# Patient Record
Sex: FEMALE | Race: WHITE | NOT HISPANIC OR LATINO | Employment: UNEMPLOYED | URBAN - NONMETROPOLITAN AREA
[De-identification: names, ages, dates, MRNs, and addresses within clinical notes are randomized per-mention and may not be internally consistent; named-entity substitution may affect disease eponyms.]

---

## 2023-03-29 LAB — GLUCOSE, 1 HR SCREEN, PREG: 108 MG/DL

## 2023-05-25 LAB — GLUCOSE, 1 HR SCREEN, PREG: 112 MG/DL

## 2023-06-14 LAB
BASOPHILS (10*3/UL) IN BLOOD BY AUTOMATED COUNT: 0.02 X10E9/L (ref 0–0.1)
BASOPHILS/100 LEUKOCYTES IN BLOOD BY AUTOMATED COUNT: 0.2 % (ref 0–2)
CALCIDIOL (25 OH VITAMIN D3) (NG/ML) IN SER/PLAS: 34 NG/ML
EOSINOPHILS (10*3/UL) IN BLOOD BY AUTOMATED COUNT: 0.04 X10E9/L (ref 0–0.7)
EOSINOPHILS/100 LEUKOCYTES IN BLOOD BY AUTOMATED COUNT: 0.4 % (ref 0–6)
ERYTHROCYTE DISTRIBUTION WIDTH (RATIO) BY AUTOMATED COUNT: 14.4 % (ref 11.5–14.5)
ERYTHROCYTE MEAN CORPUSCULAR HEMOGLOBIN CONCENTRATION (G/DL) BY AUTOMATED: 31.5 G/DL (ref 32–36)
ERYTHROCYTE MEAN CORPUSCULAR VOLUME (FL) BY AUTOMATED COUNT: 90 FL (ref 80–100)
ERYTHROCYTES (10*6/UL) IN BLOOD BY AUTOMATED COUNT: 3.95 X10E12/L (ref 4–5.2)
HEMATOCRIT (%) IN BLOOD BY AUTOMATED COUNT: 35.5 % (ref 36–46)
HEMOGLOBIN (G/DL) IN BLOOD: 11.2 G/DL (ref 12–16)
IMMATURE GRANULOCYTES/100 LEUKOCYTES IN BLOOD BY AUTOMATED COUNT: 0.6 % (ref 0–0.9)
LEUKOCYTES (10*3/UL) IN BLOOD BY AUTOMATED COUNT: 11 X10E9/L (ref 4.4–11.3)
LYMPHOCYTES (10*3/UL) IN BLOOD BY AUTOMATED COUNT: 2.2 X10E9/L (ref 1.2–4.8)
LYMPHOCYTES/100 LEUKOCYTES IN BLOOD BY AUTOMATED COUNT: 20 % (ref 13–44)
MONOCYTES (10*3/UL) IN BLOOD BY AUTOMATED COUNT: 0.48 X10E9/L (ref 0.1–1)
MONOCYTES/100 LEUKOCYTES IN BLOOD BY AUTOMATED COUNT: 4.4 % (ref 2–10)
NEUTROPHILS (10*3/UL) IN BLOOD BY AUTOMATED COUNT: 8.21 X10E9/L (ref 1.2–7.7)
NEUTROPHILS/100 LEUKOCYTES IN BLOOD BY AUTOMATED COUNT: 74.4 % (ref 40–80)
NRBC (PER 100 WBCS) BY AUTOMATED COUNT: 0 /100 WBC (ref 0–0)
PLATELETS (10*3/UL) IN BLOOD AUTOMATED COUNT: 233 X10E9/L (ref 150–450)

## 2023-12-12 ENCOUNTER — OFFICE VISIT (OUTPATIENT)
Dept: PRIMARY CARE | Facility: CLINIC | Age: 33
End: 2023-12-12
Payer: COMMERCIAL

## 2023-12-12 VITALS
SYSTOLIC BLOOD PRESSURE: 139 MMHG | WEIGHT: 281 LBS | HEART RATE: 97 BPM | BODY MASS INDEX: 42.73 KG/M2 | DIASTOLIC BLOOD PRESSURE: 83 MMHG | OXYGEN SATURATION: 96 %

## 2023-12-12 DIAGNOSIS — R05.1 ACUTE COUGH: Primary | ICD-10-CM

## 2023-12-12 PROCEDURE — 99213 OFFICE O/P EST LOW 20 MIN: CPT | Performed by: FAMILY MEDICINE

## 2023-12-12 PROCEDURE — 1036F TOBACCO NON-USER: CPT | Performed by: FAMILY MEDICINE

## 2023-12-12 RX ORDER — GUAIFENESIN 1200 MG
TABLET, EXTENDED RELEASE 12 HR ORAL
COMMUNITY

## 2023-12-12 RX ORDER — CEFDINIR 300 MG/1
300 CAPSULE ORAL 2 TIMES DAILY
Qty: 14 CAPSULE | Refills: 0 | Status: SHIPPED | OUTPATIENT
Start: 2023-12-12 | End: 2023-12-19

## 2023-12-12 RX ORDER — ALBUTEROL SULFATE 90 UG/1
2 AEROSOL, METERED RESPIRATORY (INHALATION) AS NEEDED
COMMUNITY
Start: 2019-01-02 | End: 2024-04-08 | Stop reason: SDUPTHER

## 2023-12-12 RX ORDER — PSYLLIUM HUSK 0.4 G
1 CAPSULE ORAL 2 TIMES DAILY
COMMUNITY
End: 2024-02-28 | Stop reason: WASHOUT

## 2023-12-12 RX ORDER — HYDROXYCHLOROQUINE SULFATE 200 MG/1
1 TABLET, FILM COATED ORAL
COMMUNITY
Start: 2023-06-27

## 2023-12-12 RX ORDER — DICLOXACILLIN SODIUM 500 MG/1
500 CAPSULE ORAL
COMMUNITY
Start: 2023-09-22 | End: 2024-02-28 | Stop reason: WASHOUT

## 2023-12-12 RX ORDER — SERTRALINE HYDROCHLORIDE 25 MG/1
25 TABLET, FILM COATED ORAL DAILY
COMMUNITY

## 2023-12-12 RX ORDER — NORETHINDRONE ACETATE AND ETHINYL ESTRADIOL, ETHINYL ESTRADIOL AND FERROUS FUMARATE 1MG-10(24)
1 KIT ORAL
COMMUNITY
Start: 2020-09-15

## 2023-12-12 ASSESSMENT — ENCOUNTER SYMPTOMS
CHILLS: 0
MYALGIAS: 0
COUGH: 1
HEARTBURN: 0
HEADACHES: 0
WEIGHT LOSS: 0
WHEEZING: 0
RHINORRHEA: 0
SORE THROAT: 0
SWEATS: 0
SHORTNESS OF BREATH: 0
HEMOPTYSIS: 0
FEVER: 0

## 2023-12-12 NOTE — PROGRESS NOTES
Subjective   Patient ID: Keya Moreno is a 33 y.o. female who presents for Cough.    HPI   X several weeks (2)  Was improving until yesterday  Cough is productive (tastes metallic)  No fevers  Son is in  and sick frequently  +fatigue (uses cpap)  Taking flonase intermittently and zyrtec and albuterol inhaler which is helping  +breastfeeding a 4mo    Review of Systems  See HPI    Objective   /83 (BP Location: Left arm, Patient Position: Sitting, BP Cuff Size: Large adult)   Pulse 97   Wt 127 kg (281 lb)   SpO2 96%   BMI 42.73 kg/m²     Physical Exam  Constitutional: Well developed, well nourished, alert and in no acute distress.  Head and Face: NC/AT  Eyes: Normal external exam.   ENT: External inspection of ears normal, tympanic membranes visualized and normal. Nasal mucosa and turbinates swollen and erythematous, clear nasal discharge present. Oral mucosa moist, oropharynx clear without tonsillar exudate or erythema.   Neck: Supple. No cervical lymphadenopathy   Cardiovascular: Regular rate and rhythm, normal S1 and S2, no murmurs, gallops, or rubs.   Pulmonary: No respiratory distress, lungs clear to auscultation bilaterally. No wheezes, rhonchi, rales.  Skin: Warm, well perfused, normal skin turgor and color.   Neurologic: Cranial nerves II-XII grossly intact.    Assessment/Plan   UPPER RESPIRATORY INFECTION PLAN:  Fill antibiotic  - Consider trial of daily probiotic to help minimize GI side effects- Align, Culturelle, DanActive, Florastor are recommended.     Drink at least 6-8 glasses of water daily to thin secretions.     A teaspoon of honey every 4 hours as needed for cough has been shown to reduce cough as well or better than over-the-counter cough suppressants.  Delsym or Robitussin are recommended to stop cough.  Cough drops can also be helpful.     For nasal congestion, please use Papi Med Sinus Rinse at least once daily to rinse out your sinuses.      For sore throat, try honey in  tea, Chloraseptic, Cepacol throat lozenges, and salt water gargles.      Fever or aches can be helped by taking acetaminophen (Tylenol) every four hours as needed, or ibuprofen (Motrin, Advil) or naproxen (Aleve) as directed if you are able.   Maximum dosing of ibuprofen is 800 mg every 8 hours and maximum dose of tylenol is 1,000 mg every 8 hours - do not use for longer than 1 week unless directed by your doctor.       If you should develop a fever and worsening cough or nasal secretions with consistent yellow or green phlegm, please contact us.

## 2024-02-09 ENCOUNTER — OFFICE (OUTPATIENT)
Dept: URBAN - METROPOLITAN AREA CLINIC 27 | Facility: CLINIC | Age: 34
End: 2024-02-09

## 2024-02-09 VITALS
HEART RATE: 89 BPM | SYSTOLIC BLOOD PRESSURE: 118 MMHG | WEIGHT: 271 LBS | HEIGHT: 68 IN | TEMPERATURE: 98.1 F | DIASTOLIC BLOOD PRESSURE: 79 MMHG

## 2024-02-09 DIAGNOSIS — R93.89 ABNORMAL FINDINGS ON DIAGNOSTIC IMAGING OF OTHER SPECIFIED B: ICD-10-CM

## 2024-02-09 DIAGNOSIS — R11.0 NAUSEA: ICD-10-CM

## 2024-02-09 DIAGNOSIS — K21.9 GASTRO-ESOPHAGEAL REFLUX DISEASE WITHOUT ESOPHAGITIS: ICD-10-CM

## 2024-02-09 DIAGNOSIS — K57.92 DIVERTICULITIS OF INTESTINE, PART UNSPECIFIED, WITHOUT PERFO: ICD-10-CM

## 2024-02-09 DIAGNOSIS — R10.9 UNSPECIFIED ABDOMINAL PAIN: ICD-10-CM

## 2024-02-09 PROCEDURE — 99203 OFFICE O/P NEW LOW 30 MIN: CPT | Performed by: NURSE PRACTITIONER

## 2024-02-09 RX ORDER — OMEPRAZOLE 20 MG/1
20 TABLET, DELAYED RELEASE ORAL
Qty: 30 | Refills: 3 | Status: ACTIVE
Start: 2024-02-09

## 2024-02-14 PROBLEM — Z86.69 HX OF MIGRAINE HEADACHES: Status: ACTIVE | Noted: 2019-04-22

## 2024-02-14 PROBLEM — F33.0 DEPRESSION, MAJOR, RECURRENT, MILD (CMS-HCC): Status: ACTIVE | Noted: 2024-02-14

## 2024-02-14 PROBLEM — G47.9 SLEEP DISORDER: Status: ACTIVE | Noted: 2024-02-14

## 2024-02-14 PROBLEM — N20.1 LEFT URETERAL STONE: Status: ACTIVE | Noted: 2021-05-19

## 2024-02-14 PROBLEM — G47.33 OBSTRUCTIVE SLEEP APNEA SYNDROME: Status: ACTIVE | Noted: 2023-01-16

## 2024-02-14 PROBLEM — G43.109 OCULAR MIGRAINE: Status: ACTIVE | Noted: 2023-01-16

## 2024-02-14 PROBLEM — Z86.16 HISTORY OF COVID-19: Status: ACTIVE | Noted: 2023-01-16

## 2024-02-14 PROBLEM — J30.1 SEASONAL ALLERGIC RHINITIS DUE TO POLLEN: Status: ACTIVE | Noted: 2021-04-30

## 2024-02-14 PROBLEM — L30.1 DYSHIDROTIC HAND DERMATITIS: Status: ACTIVE | Noted: 2024-02-14

## 2024-02-14 PROBLEM — J30.9 ALLERGIC RHINITIS: Status: ACTIVE | Noted: 2024-02-14

## 2024-02-14 PROBLEM — R76.8 ANA POSITIVE: Status: ACTIVE | Noted: 2019-03-07

## 2024-02-14 PROBLEM — M35.9 UNDIFFERENTIATED CONNECTIVE TISSUE DISEASE (MULTI): Status: ACTIVE | Noted: 2019-01-04

## 2024-02-14 PROBLEM — R39.14 FEELING OF INCOMPLETE BLADDER EMPTYING: Status: ACTIVE | Noted: 2021-05-19

## 2024-02-14 PROBLEM — F98.8 ADD (ATTENTION DEFICIT DISORDER): Status: ACTIVE | Noted: 2024-02-14

## 2024-02-14 PROBLEM — L65.9 HAIR LOSS: Status: ACTIVE | Noted: 2024-02-14

## 2024-02-14 PROBLEM — K58.9 IRRITABLE BOWEL SYNDROME: Status: ACTIVE | Noted: 2019-06-12

## 2024-02-14 PROBLEM — E66.9 OBESITY, CLASS II, BMI 35-39.9: Status: ACTIVE | Noted: 2020-09-15

## 2024-02-14 PROBLEM — E66.812 OBESITY, CLASS II, BMI 35-39.9: Status: ACTIVE | Noted: 2020-09-15

## 2024-02-14 PROBLEM — E78.5 HYPERLIPIDEMIA: Status: ACTIVE | Noted: 2024-02-14

## 2024-02-14 PROBLEM — Z14.8 GENETIC CARRIER STATUS: Status: ACTIVE | Noted: 2019-06-12

## 2024-02-14 PROBLEM — J30.89 ALLERGIC RHINITIS DUE TO DUST MITE: Status: ACTIVE | Noted: 2021-04-30

## 2024-02-14 PROBLEM — J45.909 ASTHMA (HHS-HCC): Status: ACTIVE | Noted: 2024-02-14

## 2024-02-14 PROBLEM — E55.9 VITAMIN D DEFICIENCY: Status: ACTIVE | Noted: 2023-04-11

## 2024-02-14 PROBLEM — F41.9 ANXIETY, MILD: Status: ACTIVE | Noted: 2024-02-14

## 2024-02-14 PROBLEM — F90.0 ATTENTION DEFICIT HYPERACTIVITY DISORDER, PREDOMINANTLY INATTENTIVE TYPE: Status: ACTIVE | Noted: 2020-09-15

## 2024-02-14 RX ORDER — SUMATRIPTAN SUCCINATE 25 MG/1
25 TABLET ORAL
COMMUNITY
Start: 2024-02-14 | End: 2024-02-28 | Stop reason: WASHOUT

## 2024-02-27 PROBLEM — K57.92 DIVERTICULITIS: Chronic | Status: ACTIVE | Noted: 2024-02-27

## 2024-02-27 PROBLEM — Z09 HOSPITAL DISCHARGE FOLLOW-UP: Chronic | Status: ACTIVE | Noted: 2024-02-27

## 2024-02-27 PROBLEM — R10.84 GENERALIZED ABDOMINAL PAIN: Chronic | Status: ACTIVE | Noted: 2024-02-27

## 2024-02-27 NOTE — PROGRESS NOTES
Subjective        Keya Moreno. Is 33 y.o.. female. Here for follow up office visit-       Chief Complaint   Patient presents with    Follow-up     [facility]       HPI:      How is patient doing today?  Not feeling great today, not worse but not better     Still poor appetite    But no fever, no nausea and vomiting , decreased abd pain   No blood in stool        When is follow up appointment with GI specilaist - Dr Harding?  She hasn't heard from them.              Follow up for ED/UC/Hospital admission:  Date(s):      Today -   2   days post discharge           Dx:  Abdominal pain   Diverticulitis        ED/hosp course-  Patient presents with:  Abdominal Pain: Patient is having ABD pain and feels it is related to her diverticulitis. She is on her second round of antibiotics and the pain hasn't went away. -N/-V    Patient is a 33-year-old female presented to ED with complaints of left lower quadrant abdominal pain. Concern for diverticulitis. She has pain for the past several days with no nausea or vomiting. Recently seen here and treated for diverticulitis, completed a course of antibiotics and got better. Worsened over the weekend and states her gastroenterologist called her in Augmentin. She is here today for evaluation. She does note a generally decreased appetite. Notes she has lost about 18 pounds in the past few weeks due to not eating. She is not vomiting. Does note soft stools is described as being pencil thin but no true watery diarrhea, no blood in her stool.       Labs and urine done in ED        CT ABD/PEL W IVCON  IMPRESSION:    Mild appendiceal dilation, correlate for right lower quadrant pain.    Improved findings of colonic diverticulitis.    Nonspecific mesenteric lymph nodes, similar to prior.    CT scan - borderline dilated appendix without secondary signs of appendicitis.        Treatment- stop Augmentin,  then Saturday - pt called Dr Harding and he restarted Augmentin again       Start  probiotic      Recommended-  Outpatient follow up with GI/PCP.          PMH-   IBS   1/2020 colonosocpy       Has 7 month old baby           Current Outpatient Medications   Medication Instructions    acetaminophen (Tylenol) 325 mg capsule oral    albuterol (ProAir HFA) 90 mcg/actuation inhaler 2 puffs, inhalation, As needed    hydroxychloroquine (Plaquenil) 200 mg tablet 1 tablet, oral, 2 times daily with meals    inulin 4 g, oral, Daily RT    norethindrone-e.estradioL-iron (Lo Loestrin Fe) 1 mg-10 mcg (24)/10 mcg (2) tablet 1 tablet, oral, Daily RT    PRENATAL 2-IRON-FOLIC ACID-OM3 ORAL Prenatal    sertraline (ZOLOFT) 25 mg, oral, Daily           Patient Active Problem List    Diagnosis Date Noted    Hospital discharge follow-up 02/27/2024    Diverticulitis 02/27/2024    Generalized abdominal pain 02/27/2024    ADD (attention deficit disorder) 02/14/2024    Allergic rhinitis 02/14/2024    Anxiety, mild 02/14/2024    Asthma 02/14/2024    Dyshidrotic hand dermatitis 02/14/2024    Hair loss 02/14/2024    Hyperlipidemia 02/14/2024    Depression, major, recurrent, mild (CMS/HCC) 02/14/2024    Sleep disorder 02/14/2024    Vitamin D deficiency 04/11/2023    History of COVID-19 01/16/2023    Obstructive sleep apnea syndrome 01/16/2023    Ocular migraine 01/16/2023    Feeling of incomplete bladder emptying 05/19/2021    Left ureteral stone 05/19/2021    Allergic rhinitis due to dust mite 04/30/2021    Seasonal allergic rhinitis due to pollen 04/30/2021    Attention deficit hyperactivity disorder, predominantly inattentive type 09/15/2020    Obesity, Class II, BMI 35-39.9 09/15/2020    Genetic carrier status 06/12/2019    Irritable bowel syndrome 06/12/2019    Hx of migraine headaches 04/22/2019    CHELE positive 03/07/2019    Undifferentiated connective tissue disease (CMS/HCC) 01/04/2019                     Patient Care Team:  Ruth Sofia MD as PCP - General  Alexis Morris MD as Referring Physician  (Neurology)  Man Harding MD as Referring Physician (Gastroenterology)    REVIEW OF SYSTEMS:        Objective      Vitals:    02/28/24 0932   BP: 107/68   BP Location: Left arm   Patient Position: Sitting   BP Cuff Size: Large adult   Pulse: 79   Temp: 36.5 °C (97.7 °F)   TempSrc: Temporal   SpO2: 95%   Weight: 122 kg (268 lb 3.2 oz)       PHYSICAL:      Pt is A and O x3, NAD, nontoxic, well-hydrated   Head- normocephalic and atraumatic,   EYES- conjunctiva- normal   lids- normal  EARS/NOSE- normal external exam   CV- RRR without murmur  PULM- CTA bilaterally, normal respiratory effort  RESPIRATORY EFFORT- normal , no retractions or nasal flaring   ABD- normoactive BS's , soft, no HSM, no CVAT, NT   EXT- no edema,NT  SKIN- no abnormal skin lesions noted  NEURO- no focal deficits  PSYCH- pleasant, normal judgement and insight        BP Readings from Last 3 Encounters:   02/28/24 107/68   12/12/23 139/83   06/27/23 110/76     Not dizzy or lighthheaded     Wt Readings from Last 3 Encounters:   02/28/24 122 kg (268 lb 3.2 oz)   12/12/23 127 kg (281 lb)   06/27/23 128 kg (283 lb)       BMI Readings from Last 3 Encounters:   02/28/24 40.78 kg/m²   12/12/23 42.73 kg/m²   06/27/23 43.03 kg/m²           The number and complexity of problems addressed is considered moderate.  The amount and/or complexity of data reviewed and analyzed is considered moderate. The risk of complications and/or morbidity/mortality of patient is considered moderate. Overall, this patient encounter is considered a moderate risk visit.    The patient is here for a hospital follow up.  Hospital records were reviewed prior to visit, including relevant labs, imaging findings, consultant notes, and discharge summary.  Medications were reconciled and are current, reviewed today.    Time spent reviewing hospital records prior to today's face-to-face office visit=        25 minutes        Today's face-to-face office visit is occurring within   -      2    -    days of discharge.    No TCM- pt seen within 2 days discharge            Assessment/Plan      Problem List Items Addressed This Visit          Active Problems    Diverticulitis (Chronic)    Generalized abdominal pain (Chronic)    Hospital discharge follow-up - Primary (Chronic)       Finish this round of Augmentin        Follow up with GI specialist - Dr Harding - we did call their office for appointment - May 15 - will be with the PA- and patient is on cancellation list also       Go to ED fo increased abdominal pain- especially RLQ , fever, vomiting, blood int stool, decreased urination, lethargy - discussed with pt         Patient will be seeing Dr Harding in April for prescheduled colonoscopy             Current Outpatient Medications:     acetaminophen (Tylenol) 325 mg capsule, Take by mouth., Disp: , Rfl:     albuterol (ProAir HFA) 90 mcg/actuation inhaler, Inhale 2 puffs if needed., Disp: , Rfl:     hydroxychloroquine (Plaquenil) 200 mg tablet, Take 1 tablet (200 mg) by mouth 2 times a day with meals., Disp: , Rfl:     inulin 2 gram tablet,chewable, Chew 4 g once daily., Disp: , Rfl:     norethindrone-e.estradioL-iron (Lo Loestrin Fe) 1 mg-10 mcg (24)/10 mcg (2) tablet, Take 1 tablet by mouth once daily., Disp: , Rfl:     PRENATAL 2-IRON-FOLIC ACID-OM3 ORAL, Prenatal, Disp: , Rfl:     sertraline (Zoloft) 25 mg tablet, Take 1 tablet (25 mg) by mouth once daily., Disp: , Rfl:

## 2024-02-28 ENCOUNTER — OFFICE VISIT (OUTPATIENT)
Dept: PRIMARY CARE | Facility: CLINIC | Age: 34
End: 2024-02-28
Payer: COMMERCIAL

## 2024-02-28 VITALS
TEMPERATURE: 97.7 F | SYSTOLIC BLOOD PRESSURE: 107 MMHG | OXYGEN SATURATION: 95 % | HEART RATE: 79 BPM | DIASTOLIC BLOOD PRESSURE: 68 MMHG | BODY MASS INDEX: 40.78 KG/M2 | WEIGHT: 268.2 LBS

## 2024-02-28 DIAGNOSIS — Z09 HOSPITAL DISCHARGE FOLLOW-UP: Primary | Chronic | ICD-10-CM

## 2024-02-28 DIAGNOSIS — R10.84 GENERALIZED ABDOMINAL PAIN: Chronic | ICD-10-CM

## 2024-02-28 DIAGNOSIS — K57.92 DIVERTICULITIS: Chronic | ICD-10-CM

## 2024-02-28 PROCEDURE — 99495 TRANSJ CARE MGMT MOD F2F 14D: CPT | Performed by: FAMILY MEDICINE

## 2024-02-28 PROCEDURE — 1036F TOBACCO NON-USER: CPT | Performed by: FAMILY MEDICINE

## 2024-03-01 ENCOUNTER — TELEPHONE (OUTPATIENT)
Dept: PRIMARY CARE | Facility: CLINIC | Age: 34
End: 2024-03-01
Payer: COMMERCIAL

## 2024-03-01 DIAGNOSIS — N39.0 URINARY TRACT INFECTION WITHOUT HEMATURIA, SITE UNSPECIFIED: Primary | ICD-10-CM

## 2024-03-01 RX ORDER — SULFAMETHOXAZOLE AND TRIMETHOPRIM 800; 160 MG/1; MG/1
1 TABLET ORAL 2 TIMES DAILY
Qty: 20 TABLET | Refills: 0 | Status: SHIPPED | OUTPATIENT
Start: 2024-03-01 | End: 2024-03-11

## 2024-03-01 NOTE — TELEPHONE ENCOUNTER
Pt has apt on Monday with GI Dr Harding and she seen urine cx from the ER visit on my chart today that shows she has e coil and she called gi they told her to follow up with you.   Pt is having abd pain at and diarrhea.   Pt is hydrated but no appetite. Pt is currently on Augmentin again that started on Saturday.   When she was looking up medication it started it was not a good treatment for e coil.   Please advise poss medication change or what to do?

## 2024-03-01 NOTE — TELEPHONE ENCOUNTER
Left detailed message on patients voicemail, advised patient to give us a call back with any questions or concerns.

## 2024-03-08 ENCOUNTER — OFFICE (OUTPATIENT)
Dept: URBAN - METROPOLITAN AREA CLINIC 26 | Facility: CLINIC | Age: 34
End: 2024-03-08

## 2024-03-08 VITALS
TEMPERATURE: 97.8 F | SYSTOLIC BLOOD PRESSURE: 115 MMHG | DIASTOLIC BLOOD PRESSURE: 76 MMHG | HEIGHT: 68 IN | WEIGHT: 272 LBS | HEART RATE: 81 BPM

## 2024-03-08 DIAGNOSIS — I88.0 NONSPECIFIC MESENTERIC LYMPHADENITIS: ICD-10-CM

## 2024-03-08 DIAGNOSIS — K57.92 DIVERTICULITIS OF INTESTINE, PART UNSPECIFIED, WITHOUT PERFO: ICD-10-CM

## 2024-03-08 DIAGNOSIS — K21.9 GASTRO-ESOPHAGEAL REFLUX DISEASE WITHOUT ESOPHAGITIS: ICD-10-CM

## 2024-03-08 DIAGNOSIS — R10.30 LOWER ABDOMINAL PAIN, UNSPECIFIED: ICD-10-CM

## 2024-03-08 PROCEDURE — 99214 OFFICE O/P EST MOD 30 MIN: CPT | Performed by: NURSE PRACTITIONER

## 2024-04-08 VITALS
DIASTOLIC BLOOD PRESSURE: 52 MMHG | SYSTOLIC BLOOD PRESSURE: 136 MMHG | SYSTOLIC BLOOD PRESSURE: 136 MMHG | SYSTOLIC BLOOD PRESSURE: 106 MMHG | RESPIRATION RATE: 20 BRPM | DIASTOLIC BLOOD PRESSURE: 82 MMHG | HEART RATE: 83 BPM | OXYGEN SATURATION: 98 % | OXYGEN SATURATION: 99 % | SYSTOLIC BLOOD PRESSURE: 137 MMHG | HEART RATE: 93 BPM | RESPIRATION RATE: 19 BRPM | RESPIRATION RATE: 20 BRPM | DIASTOLIC BLOOD PRESSURE: 82 MMHG | RESPIRATION RATE: 19 BRPM | SYSTOLIC BLOOD PRESSURE: 112 MMHG | RESPIRATION RATE: 19 BRPM | HEART RATE: 93 BPM | SYSTOLIC BLOOD PRESSURE: 106 MMHG | DIASTOLIC BLOOD PRESSURE: 65 MMHG | TEMPERATURE: 97.3 F | TEMPERATURE: 97.3 F | HEART RATE: 96 BPM | HEART RATE: 96 BPM | RESPIRATION RATE: 18 BRPM | WEIGHT: 266 LBS | RESPIRATION RATE: 18 BRPM | OXYGEN SATURATION: 97 % | DIASTOLIC BLOOD PRESSURE: 52 MMHG | OXYGEN SATURATION: 97 % | DIASTOLIC BLOOD PRESSURE: 47 MMHG | OXYGEN SATURATION: 94 % | RESPIRATION RATE: 13 BRPM | RESPIRATION RATE: 22 BRPM | HEART RATE: 98 BPM | DIASTOLIC BLOOD PRESSURE: 65 MMHG | HEART RATE: 93 BPM | HEART RATE: 102 BPM | OXYGEN SATURATION: 95 % | OXYGEN SATURATION: 96 % | DIASTOLIC BLOOD PRESSURE: 40 MMHG | HEART RATE: 96 BPM | DIASTOLIC BLOOD PRESSURE: 82 MMHG | OXYGEN SATURATION: 97 % | SYSTOLIC BLOOD PRESSURE: 112 MMHG | RESPIRATION RATE: 20 BRPM | HEART RATE: 89 BPM | RESPIRATION RATE: 18 BRPM | OXYGEN SATURATION: 95 % | OXYGEN SATURATION: 96 % | RESPIRATION RATE: 22 BRPM | SYSTOLIC BLOOD PRESSURE: 93 MMHG | OXYGEN SATURATION: 94 % | SYSTOLIC BLOOD PRESSURE: 93 MMHG | WEIGHT: 266 LBS | OXYGEN SATURATION: 96 % | HEART RATE: 89 BPM | RESPIRATION RATE: 13 BRPM | HEART RATE: 102 BPM | DIASTOLIC BLOOD PRESSURE: 40 MMHG | HEART RATE: 83 BPM | OXYGEN SATURATION: 94 % | DIASTOLIC BLOOD PRESSURE: 52 MMHG | SYSTOLIC BLOOD PRESSURE: 106 MMHG | SYSTOLIC BLOOD PRESSURE: 137 MMHG | SYSTOLIC BLOOD PRESSURE: 136 MMHG | TEMPERATURE: 97.3 F | SYSTOLIC BLOOD PRESSURE: 93 MMHG | DIASTOLIC BLOOD PRESSURE: 65 MMHG | OXYGEN SATURATION: 98 % | HEART RATE: 102 BPM | WEIGHT: 266 LBS | HEIGHT: 68 IN | HEART RATE: 98 BPM | OXYGEN SATURATION: 95 % | HEART RATE: 83 BPM | RESPIRATION RATE: 22 BRPM | HEART RATE: 89 BPM | HEIGHT: 68 IN | DIASTOLIC BLOOD PRESSURE: 47 MMHG | HEART RATE: 98 BPM | OXYGEN SATURATION: 99 % | OXYGEN SATURATION: 99 % | RESPIRATION RATE: 13 BRPM | HEIGHT: 68 IN | SYSTOLIC BLOOD PRESSURE: 112 MMHG | DIASTOLIC BLOOD PRESSURE: 47 MMHG | SYSTOLIC BLOOD PRESSURE: 137 MMHG | OXYGEN SATURATION: 98 % | DIASTOLIC BLOOD PRESSURE: 40 MMHG

## 2024-04-08 DIAGNOSIS — J30.89 ALLERGIC RHINITIS DUE TO DUST MITE: ICD-10-CM

## 2024-04-08 DIAGNOSIS — J45.20 MILD INTERMITTENT ASTHMA WITHOUT COMPLICATION (HHS-HCC): ICD-10-CM

## 2024-04-08 RX ORDER — ALBUTEROL SULFATE 90 UG/1
2 AEROSOL, METERED RESPIRATORY (INHALATION) AS NEEDED
Qty: 18 G | Refills: 1 | Status: SHIPPED | OUTPATIENT
Start: 2024-04-08

## 2024-04-14 PROBLEM — R63.0 ANOREXIA: Status: ACTIVE | Noted: 2024-04-15

## 2024-04-14 PROBLEM — R63.4 WEIGHT LOSS: Status: ACTIVE | Noted: 2024-04-15

## 2024-04-15 ENCOUNTER — AMBULATORY SURGICAL CENTER (OUTPATIENT)
Dept: URBAN - METROPOLITAN AREA SURGERY 12 | Facility: SURGERY | Age: 34
End: 2024-04-15
Payer: COMMERCIAL

## 2024-04-15 ENCOUNTER — OFFICE (OUTPATIENT)
Dept: URBAN - METROPOLITAN AREA PATHOLOGY 2 | Facility: PATHOLOGY | Age: 34
End: 2024-04-15
Payer: COMMERCIAL

## 2024-04-15 DIAGNOSIS — K64.4 RESIDUAL HEMORRHOIDAL SKIN TAGS: ICD-10-CM

## 2024-04-15 DIAGNOSIS — K21.9 GASTRO-ESOPHAGEAL REFLUX DISEASE WITHOUT ESOPHAGITIS: ICD-10-CM

## 2024-04-15 DIAGNOSIS — K44.9 DIAPHRAGMATIC HERNIA WITHOUT OBSTRUCTION OR GANGRENE: ICD-10-CM

## 2024-04-15 DIAGNOSIS — K62.1 RECTAL POLYP: ICD-10-CM

## 2024-04-15 DIAGNOSIS — K57.92 DIVERTICULITIS OF INTESTINE, PART UNSPECIFIED, WITHOUT PERFO: ICD-10-CM

## 2024-04-15 DIAGNOSIS — K57.30 DIVERTICULOSIS OF LARGE INTESTINE WITHOUT PERFORATION OR ABS: ICD-10-CM

## 2024-04-15 DIAGNOSIS — K22.89 OTHER SPECIFIED DISEASE OF ESOPHAGUS: ICD-10-CM

## 2024-04-15 DIAGNOSIS — K64.8 OTHER HEMORRHOIDS: ICD-10-CM

## 2024-04-15 DIAGNOSIS — R63.0 ANOREXIA: ICD-10-CM

## 2024-04-15 DIAGNOSIS — R10.30 LOWER ABDOMINAL PAIN, UNSPECIFIED: ICD-10-CM

## 2024-04-15 DIAGNOSIS — R63.4 ABNORMAL WEIGHT LOSS: ICD-10-CM

## 2024-04-15 DIAGNOSIS — K31.89 OTHER DISEASES OF STOMACH AND DUODENUM: ICD-10-CM

## 2024-04-15 DIAGNOSIS — K29.50 UNSPECIFIED CHRONIC GASTRITIS WITHOUT BLEEDING: ICD-10-CM

## 2024-04-15 PROCEDURE — 88313 SPECIAL STAINS GROUP 2: CPT | Performed by: PATHOLOGY

## 2024-04-15 PROCEDURE — 43239 EGD BIOPSY SINGLE/MULTIPLE: CPT | Mod: 51 | Performed by: INTERNAL MEDICINE

## 2024-04-15 PROCEDURE — 45380 COLONOSCOPY AND BIOPSY: CPT | Performed by: INTERNAL MEDICINE

## 2024-04-15 PROCEDURE — 88342 IMHCHEM/IMCYTCHM 1ST ANTB: CPT | Performed by: PATHOLOGY

## 2024-04-15 PROCEDURE — 88305 TISSUE EXAM BY PATHOLOGIST: CPT | Performed by: PATHOLOGY

## 2024-04-23 ENCOUNTER — OFFICE VISIT (OUTPATIENT)
Dept: PRIMARY CARE | Facility: CLINIC | Age: 34
End: 2024-04-23
Payer: COMMERCIAL

## 2024-04-23 VITALS
SYSTOLIC BLOOD PRESSURE: 111 MMHG | OXYGEN SATURATION: 97 % | HEART RATE: 87 BPM | BODY MASS INDEX: 41.8 KG/M2 | DIASTOLIC BLOOD PRESSURE: 65 MMHG | TEMPERATURE: 98.4 F | WEIGHT: 274.9 LBS | RESPIRATION RATE: 18 BRPM

## 2024-04-23 DIAGNOSIS — R30.0 DYSURIA: Primary | ICD-10-CM

## 2024-04-23 DIAGNOSIS — J30.9 ALLERGIC RHINITIS, UNSPECIFIED SEASONALITY, UNSPECIFIED TRIGGER: Chronic | ICD-10-CM

## 2024-04-23 PROCEDURE — 1036F TOBACCO NON-USER: CPT | Performed by: FAMILY MEDICINE

## 2024-04-23 PROCEDURE — 81001 URINALYSIS AUTO W/SCOPE: CPT

## 2024-04-23 PROCEDURE — 99214 OFFICE O/P EST MOD 30 MIN: CPT | Performed by: FAMILY MEDICINE

## 2024-04-23 PROCEDURE — 87086 URINE CULTURE/COLONY COUNT: CPT

## 2024-04-23 RX ORDER — CEPHALEXIN 500 MG/1
500 CAPSULE ORAL EVERY 12 HOURS
COMMUNITY
Start: 2024-04-19 | End: 2024-04-24

## 2024-04-23 RX ORDER — AZELASTINE HYDROCHLORIDE, FLUTICASONE PROPIONATE 137; 50 UG/1; UG/1
1 SPRAY, METERED NASAL 2 TIMES DAILY
Qty: 1 EACH | Refills: 1 | Status: SHIPPED | OUTPATIENT
Start: 2024-04-23

## 2024-04-23 RX ORDER — OMEPRAZOLE 20 MG/1
20 TABLET, DELAYED RELEASE ORAL NIGHTLY
COMMUNITY
Start: 2024-04-08

## 2024-04-23 RX ORDER — NORETHINDRONE 0.35 MG
KIT ORAL
COMMUNITY
Start: 2024-03-16 | End: 2024-04-23 | Stop reason: WASHOUT

## 2024-04-23 RX ORDER — TRETINOIN 0.25 MG/G
CREAM TOPICAL
COMMUNITY
Start: 2024-04-20

## 2024-04-23 RX ORDER — CLINDAMYCIN PHOSPHATE 10 UG/ML
LOTION TOPICAL
COMMUNITY
Start: 2024-04-20

## 2024-04-23 ASSESSMENT — ENCOUNTER SYMPTOMS
FLANK PAIN: 0
NAUSEA: 0
SWEATS: 0
FREQUENCY: 1
VOMITING: 0
CHILLS: 1
DYSURIA: 1
HEMATURIA: 0

## 2024-04-23 NOTE — PROGRESS NOTES
Subjective        Keya Moreno is a 33 y.o. female who presents for      HPI:          Chief Complaint   Patient presents with    Female Dysuria      x 4 days  Pt denies fever, nausea/vomiting, abd pain, hematuria, frequency and back pain.  PT had telemed appt 04/19. RX for Cephalexin. 1 day left of ABX   Saw - UC - Good Rx           Keya Moreno. is a  33 y.o..  female. Who is here for acute visit today.        Pt saw Dr Griffin - 3/6/24 - abd symptoms resolved at that time      Dr Harding- colonoscopy- last Monday- diverticulitis resolved and appendix ok                 Social History     Tobacco Use   Smoking Status Never   Smokeless Tobacco Never         Review of Systems:        Objective        Vitals:    04/23/24 1106   BP: 111/65   BP Location: Left arm   Patient Position: Sitting   BP Cuff Size: Large adult   Pulse: 87   Resp: 18   Temp: 36.9 °C (98.4 °F)   SpO2: 97%   Weight: 125 kg (274 lb 14.4 oz)       Pt is A and O x3, NAD, nontoxic, well-hydrated   Head- normocephalic and atraumatic,   EYES- conjunctiva- normal   lids- normal  EARS/NOSE- normal external exam   CV- RRR without murmur  PULM- CTA bilaterally, normal respiratory effort  RESPIRATORY EFFORT- normal , no retractions or nasal flaring   ABD- normoactive BS's , soft, no HSM, no CVAT, NT   EXT- no edema,NT  SKIN- no abnormal skin lesions noted  NEURO- no focal deficits  PSYCH- pleasant, normal judgement and insight          The number and complexity of problems addressed is considered moderate.  The amount and/or complexity of data reviewed and analyzed is considered moderate. The risk of complications and/or morbidity/mortality of patient is considered moderate. Overall, this patient encounter is considered a moderate risk visit.            BP Readings from Last 3 Encounters:   04/23/24 111/65   02/28/24 107/68   12/12/23 139/83           Wt Readings from Last 3 Encounters:   04/23/24 125 kg (274 lb 14.4 oz)   02/28/24 122 kg (268 lb 3.2  oz)   12/12/23 127 kg (281 lb)         BMI Readings from Last 3 Encounters:   04/23/24 41.80 kg/m²   02/28/24 40.78 kg/m²   12/12/23 42.73 kg/m²         Assessment/Plan      1. Dysuria  Urinalysis with Reflex Microscopic    Urine Culture    Urinalysis with Reflex Microscopic      2. Allergic rhinitis, unspecified seasonality, unspecified trigger  azelastine-fluticasone (Dymista) 137-50 mcg/spray nasal spray          Orders Placed This Encounter   Procedures    Urine Culture    Urinalysis with Reflex Microscopic           Urine ordered       Increase fluids      No change with Cephalexin        Send urine culture         Call if no better or if symptoms worsen

## 2024-04-24 ENCOUNTER — TELEPHONE (OUTPATIENT)
Dept: PRIMARY CARE | Facility: CLINIC | Age: 34
End: 2024-04-24
Payer: COMMERCIAL

## 2024-04-24 LAB
APPEARANCE UR: ABNORMAL
BACTERIA UR CULT: NORMAL
BILIRUB UR STRIP.AUTO-MCNC: NEGATIVE MG/DL
COLOR UR: ABNORMAL
GLUCOSE UR STRIP.AUTO-MCNC: NORMAL MG/DL
KETONES UR STRIP.AUTO-MCNC: NEGATIVE MG/DL
LEUKOCYTE ESTERASE UR QL STRIP.AUTO: NEGATIVE
MUCOUS THREADS #/AREA URNS AUTO: NORMAL /LPF
NITRITE UR QL STRIP.AUTO: NEGATIVE
PH UR STRIP.AUTO: 5.5 [PH]
PROT UR STRIP.AUTO-MCNC: ABNORMAL MG/DL
RBC # UR STRIP.AUTO: NEGATIVE /UL
RBC #/AREA URNS AUTO: NORMAL /HPF
SP GR UR STRIP.AUTO: 1.02
SQUAMOUS #/AREA URNS AUTO: NORMAL /HPF
UROBILINOGEN UR STRIP.AUTO-MCNC: NORMAL MG/DL
WBC #/AREA URNS AUTO: NORMAL /HPF

## 2024-04-24 NOTE — TELEPHONE ENCOUNTER
Dr. Sofia,    Office received prior auth request for the azelastine-fluticasone nasal spray prescribed yesterday.     Covered alternatives are fluticasone nasal spray and azelastine nasal spray.    Any clinically relevant reason to proceed with PA? Or do you want to change medication?     Please reply so PA can be started or patient can be informed of change in med.    Thanks

## 2024-06-11 ENCOUNTER — LAB REQUISITION (OUTPATIENT)
Dept: LAB | Facility: HOSPITAL | Age: 34
End: 2024-06-11
Payer: COMMERCIAL

## 2024-06-11 DIAGNOSIS — N39.0 URINARY TRACT INFECTION, SITE NOT SPECIFIED: ICD-10-CM

## 2024-06-11 PROCEDURE — 87186 SC STD MICRODIL/AGAR DIL: CPT

## 2024-06-11 PROCEDURE — 87086 URINE CULTURE/COLONY COUNT: CPT

## 2024-06-14 LAB — BACTERIA UR CULT: ABNORMAL

## 2024-06-25 ENCOUNTER — APPOINTMENT (OUTPATIENT)
Dept: SLEEP MEDICINE | Facility: CLINIC | Age: 34
End: 2024-06-25
Payer: COMMERCIAL

## 2024-08-22 PROBLEM — Z13.1 SCREENING FOR DIABETES MELLITUS: Chronic | Status: ACTIVE | Noted: 2024-08-22

## 2024-08-22 PROBLEM — E78.5 HYPERLIPIDEMIA: Status: RESOLVED | Noted: 2024-02-14 | Resolved: 2024-08-22

## 2024-08-22 PROBLEM — E78.00 HYPERCHOLESTEREMIA: Chronic | Status: ACTIVE | Noted: 2024-08-22

## 2024-08-22 PROBLEM — F33.42 RECURRENT MAJOR DEPRESSIVE DISORDER, IN FULL REMISSION (CMS-HCC): Chronic | Status: ACTIVE | Noted: 2024-08-22

## 2024-08-22 PROBLEM — F33.0 DEPRESSION, MAJOR, RECURRENT, MILD (CMS-HCC): Status: RESOLVED | Noted: 2024-02-14 | Resolved: 2024-08-22

## 2024-08-22 PROBLEM — G47.9 SLEEP DISORDER: Status: RESOLVED | Noted: 2024-02-14 | Resolved: 2024-08-22

## 2024-08-22 PROBLEM — J30.1 SEASONAL ALLERGIC RHINITIS DUE TO POLLEN: Status: RESOLVED | Noted: 2021-04-30 | Resolved: 2024-08-22

## 2024-08-22 NOTE — PROGRESS NOTES
Subjective        Keya Moreno. Is 34 y.o.. female. Here for follow up office visit-       Chief Complaint   Patient presents with    Follow-up       HPI:        Follow up for anxiety , depression   Is pt still seeing Dr Chaparro for zoloft? No, mental health at this time    On zoloft per OB/GYN- 25 mg daily     Works well       Scales reviewed        KATT-7 Total Score: 6 (08/28/24 1256)  Patient Health Questionnaire-9 Score: 6 (08/28/24 1254)  Patient Health Questionnaire-2 Score: 1 (08/28/24 1254)         Pt is doing well          Other medical specialists are involved in patient's care.    Any recent notes that were available were reviewed.    All conditions are monitored, evaluated and assessed regularly.    Patient is compliant with current treatment regimen/medications.    Specialists involved include:      Dr Marquez- HARVEY with CPAP - working well per pt         Dr Griffin- 7/17/24 had appendectomy- appendocele/mucocele      Bailey Harmon, CNP- Neuro       Douglas is 5 yrs old- in June Lily- 13 months           REVIEW OF SYSTEMS:        Objective      Vitals:    08/28/24 1258   BP: 99/83   BP Location: Left arm   Patient Position: Sitting   BP Cuff Size: Large adult   Pulse: 85   Resp: 12   Temp: 36.4 °C (97.5 °F)   TempSrc: Temporal   SpO2: 95%   Weight: 119 kg (263 lb 6.4 oz)         Not dizzy or light headed               PHYSICAL:      Patient is alert and oriented x 3 , NAD  HEAD- normocephalic and atraumatic   EYES-conjunctiva-normal, lids - normal  EARS/NOSE- normal external exam   NECK-supple,FROM  CV- RRR without murmur  PULM- CTA bilaterally, normal respiratory effort  RESPIRATORY EFFORT- normal , no retractions or nasal flaring   ABD- normoactive BS's   EXT- no edema,NT  SKIN- no abnormal skin lesions noted  NEURO- no focal deficits  PSYCH- pleasant, normal judgement and insight      BP Readings from Last 3 Encounters:   08/28/24 99/83   04/23/24 111/65   02/28/24 107/68             Wt  Readings from Last 3 Encounters:   08/28/24 119 kg (263 lb 6.4 oz)   04/23/24 125 kg (274 lb 14.4 oz)   02/28/24 122 kg (268 lb 3.2 oz)           BMI Readings from Last 3 Encounters:   08/28/24 40.05 kg/m²   04/23/24 41.80 kg/m²   02/28/24 40.78 kg/m²               The number and complexity of problems addressed is considered moderate.  The amount and/or complexity of data reviewed and analyzed is considered moderate. The risk of complications and/or morbidity/mortality of patient is considered moderate. Overall, this patient encounter is considered a moderate risk visit.    Possible side effects and cautions  All SSRIs are thought to work in a similar way and generally can cause similar side effects, though some people may not experience any. Many side effects may go away after the first few weeks of treatment, while others may lead you and your doctor to try a different drug.    If you can't tolerate one SSRI, you may be able to tolerate a different one, as SSRIs differ in their potencies at blocking serotonin reuptake and in how quickly the body eliminates (metabolizes) the drug.    Possible side effects of SSRIs may include, among others:    Nausea, vomiting or diarrhea  Headache  Drowsiness  Dry mouth  Insomnia  Nervousness, agitation or restlessness  Dizziness  Sexual problems, such as reduced sexual desire, difficulty reaching orgasm or inability to maintain an erection (erectile dysfunction)  Impact on appetite, leading to weight loss or weight gain  Taking your medication with food may reduce the risk of nausea. Also, as long as your medication doesn't keep you from sleeping, you can reduce the impact of nausea by taking it at bedtime.    Which antidepressant is best for you depends on a number of issues, such as your symptoms and any other health conditions you may have. Ask your doctor and pharmacist about the most common possible side effects for your specific SSRI and read the patient medication guide  that comes with the prescription.    Safety issues  SSRIs are generally safe for most people. However, in some circumstances they can cause problems. For example, high doses of citalopram may cause dangerous abnormal heart rhythms, so doses over 40 milligrams (mg) a day should be avoided according to the FDA and the . Issues to discuss with your doctor before you take an SSRI include:    Drug interactions. When taking an antidepressant, tell your doctor about any other prescription or over-the-counter medications, herbs or other supplements you're taking. Some antidepressants can interfere with the effectiveness of other medications, and some can cause dangerous reactions when combined with certain medications or herbal supplements.    For example, SSRIs may increase your risk of bleeding, especially when you're taking other medications that increase the risk of bleeding, such as nonsteroidal anti-inflammatory drugs (NSAIDs), aspirin, warfarin (Coumadin, Jantoven) and other blood thinners.    Serotonin syndrome. Rarely, an antidepressant can cause high levels of serotonin to accumulate in your body. Serotonin syndrome most often occurs when two medications that raise the level of serotonin are combined. These include, for example, other antidepressants, certain pain or headache medications, and the herbal supplement Jeff's wort.    Signs and symptoms of serotonin syndrome include anxiety, agitation, high fever, sweating, confusion, tremors, restlessness, lack of coordination, major changes in blood pressure and a rapid heart rate. Seek immediate medical attention if you have any of these signs or symptoms.    Antidepressants and pregnancy. Talk to your doctor about the risks and benefits of using specific antidepressants. Some antidepressants may harm your baby if you take them during pregnancy or while you're breast-feeding. If you're taking an antidepressant and you're considering getting pregnant,  talk to your doctor about the possible risks. Don't stop taking your medication without contacting your doctor first, as stopping might pose risks for you.  Suicide risk and antidepressants  Most antidepressants are generally safe, but the FDA requires that all antidepressants carry black box warnings, the strictest warnings for prescriptions. In some cases, children, teenagers and young adults under 25 may have an increase in suicidal thoughts or behavior when taking antidepressants, especially in the first few weeks after starting or when the dose is changed.        Assessment/Plan      Assessment & Plan  Anxiety, mild  See depression       Recurrent major depressive disorder, in full remission (CMS-HCC)    Orders:    Follow Up In [facility] - PCP - Established; Future    Obstructive sleep apnea syndrome  Managed by Dr Marquez  On CPAP  Doing well        Hx of migraine headaches  Managed by Neuro  Doing well        Allergic rhinitis, unspecified seasonality, unspecified trigger         Mild intermittent asthma without complication ([facility])  Albuterol as needed          Continue medication      Ordered lab      Follow up for PE

## 2024-08-27 ASSESSMENT — PROMIS GLOBAL HEALTH SCALE
EMOTIONAL_PROBLEMS: RARELY
CARRYOUT_SOCIAL_ACTIVITIES: VERY GOOD
RATE_SOCIAL_SATISFACTION: VERY GOOD
RATE_AVERAGE_FATIGUE: MILD
RATE_GENERAL_HEALTH: GOOD
CARRYOUT_PHYSICAL_ACTIVITIES: COMPLETELY
RATE_PHYSICAL_HEALTH: GOOD
RATE_MENTAL_HEALTH: VERY GOOD
RATE_AVERAGE_PAIN: 0
RATE_QUALITY_OF_LIFE: VERY GOOD

## 2024-08-28 ENCOUNTER — APPOINTMENT (OUTPATIENT)
Dept: PRIMARY CARE | Facility: CLINIC | Age: 34
End: 2024-08-28
Payer: COMMERCIAL

## 2024-08-28 VITALS
HEART RATE: 85 BPM | WEIGHT: 263.4 LBS | OXYGEN SATURATION: 95 % | RESPIRATION RATE: 12 BRPM | SYSTOLIC BLOOD PRESSURE: 99 MMHG | DIASTOLIC BLOOD PRESSURE: 83 MMHG | TEMPERATURE: 97.5 F | BODY MASS INDEX: 40.05 KG/M2

## 2024-08-28 DIAGNOSIS — F33.42 RECURRENT MAJOR DEPRESSIVE DISORDER, IN FULL REMISSION (CMS-HCC): Chronic | ICD-10-CM

## 2024-08-28 DIAGNOSIS — J30.9 ALLERGIC RHINITIS, UNSPECIFIED SEASONALITY, UNSPECIFIED TRIGGER: Chronic | ICD-10-CM

## 2024-08-28 DIAGNOSIS — F41.9 ANXIETY, MILD: Primary | Chronic | ICD-10-CM

## 2024-08-28 DIAGNOSIS — G47.33 OBSTRUCTIVE SLEEP APNEA SYNDROME: Chronic | ICD-10-CM

## 2024-08-28 DIAGNOSIS — J45.20 MILD INTERMITTENT ASTHMA WITHOUT COMPLICATION (HHS-HCC): Chronic | ICD-10-CM

## 2024-08-28 DIAGNOSIS — Z86.69 HX OF MIGRAINE HEADACHES: Chronic | ICD-10-CM

## 2024-08-28 PROCEDURE — 1036F TOBACCO NON-USER: CPT | Performed by: FAMILY MEDICINE

## 2024-08-28 PROCEDURE — 99214 OFFICE O/P EST MOD 30 MIN: CPT | Performed by: FAMILY MEDICINE

## 2024-08-28 ASSESSMENT — PATIENT HEALTH QUESTIONNAIRE - PHQ9
7. TROUBLE CONCENTRATING ON THINGS, SUCH AS READING THE NEWSPAPER OR WATCHING TELEVISION: MORE THAN HALF THE DAYS
3. TROUBLE FALLING OR STAYING ASLEEP OR SLEEPING TOO MUCH: SEVERAL DAYS
2. FEELING DOWN, DEPRESSED OR HOPELESS: NOT AT ALL
5. POOR APPETITE OR OVEREATING: SEVERAL DAYS
10. IF YOU CHECKED OFF ANY PROBLEMS, HOW DIFFICULT HAVE THESE PROBLEMS MADE IT FOR YOU TO DO YOUR WORK, TAKE CARE OF THINGS AT HOME, OR GET ALONG WITH OTHER PEOPLE: SOMEWHAT DIFFICULT
SUM OF ALL RESPONSES TO PHQ QUESTIONS 1-9: 6
4. FEELING TIRED OR HAVING LITTLE ENERGY: SEVERAL DAYS
1. LITTLE INTEREST OR PLEASURE IN DOING THINGS: SEVERAL DAYS
6. FEELING BAD ABOUT YOURSELF - OR THAT YOU ARE A FAILURE OR HAVE LET YOURSELF OR YOUR FAMILY DOWN: NOT AT ALL
9. THOUGHTS THAT YOU WOULD BE BETTER OFF DEAD, OR OF HURTING YOURSELF: NOT AT ALL
8. MOVING OR SPEAKING SO SLOWLY THAT OTHER PEOPLE COULD HAVE NOTICED. OR THE OPPOSITE, BEING SO FIGETY OR RESTLESS THAT YOU HAVE BEEN MOVING AROUND A LOT MORE THAN USUAL: NOT AT ALL
SUM OF ALL RESPONSES TO PHQ9 QUESTIONS 1 AND 2: 1

## 2024-08-28 ASSESSMENT — ANXIETY QUESTIONNAIRES
3. WORRYING TOO MUCH ABOUT DIFFERENT THINGS: SEVERAL DAYS
IF YOU CHECKED OFF ANY PROBLEMS ON THIS QUESTIONNAIRE, HOW DIFFICULT HAVE THESE PROBLEMS MADE IT FOR YOU TO DO YOUR WORK, TAKE CARE OF THINGS AT HOME, OR GET ALONG WITH OTHER PEOPLE: SOMEWHAT DIFFICULT
2. NOT BEING ABLE TO STOP OR CONTROL WORRYING: SEVERAL DAYS
GAD7 TOTAL SCORE: 6
6. BECOMING EASILY ANNOYED OR IRRITABLE: SEVERAL DAYS
5. BEING SO RESTLESS THAT IT IS HARD TO SIT STILL: NOT AT ALL
7. FEELING AFRAID AS IF SOMETHING AWFUL MIGHT HAPPEN: SEVERAL DAYS
4. TROUBLE RELAXING: SEVERAL DAYS
1. FEELING NERVOUS, ANXIOUS, OR ON EDGE: SEVERAL DAYS

## 2024-10-08 ENCOUNTER — OFFICE VISIT (OUTPATIENT)
Dept: SLEEP MEDICINE | Facility: CLINIC | Age: 34
End: 2024-10-08
Payer: COMMERCIAL

## 2024-10-08 VITALS
SYSTOLIC BLOOD PRESSURE: 160 MMHG | WEIGHT: 256 LBS | BODY MASS INDEX: 38.8 KG/M2 | OXYGEN SATURATION: 95 % | DIASTOLIC BLOOD PRESSURE: 86 MMHG | HEART RATE: 90 BPM | HEIGHT: 68 IN

## 2024-10-08 DIAGNOSIS — G47.33 OSA (OBSTRUCTIVE SLEEP APNEA): Primary | ICD-10-CM

## 2024-10-08 PROCEDURE — 99214 OFFICE O/P EST MOD 30 MIN: CPT | Performed by: NURSE PRACTITIONER

## 2024-10-08 PROCEDURE — 3008F BODY MASS INDEX DOCD: CPT | Performed by: NURSE PRACTITIONER

## 2024-10-08 PROCEDURE — 1036F TOBACCO NON-USER: CPT | Performed by: NURSE PRACTITIONER

## 2024-10-08 RX ORDER — BUTALBITAL, ACETAMINOPHEN AND CAFFEINE 50; 325; 40 MG/1; MG/1; MG/1
1 TABLET ORAL EVERY 4 HOURS PRN
COMMUNITY
Start: 2024-10-06 | End: 2024-10-16

## 2024-10-08 ASSESSMENT — SLEEP AND FATIGUE QUESTIONNAIRES
HOW LIKELY ARE YOU TO NOD OFF OR FALL ASLEEP WHILE SITTING QUIETLY AFTER LUNCH WITHOUT ALCOHOL: WOULD NEVER DOZE
HOW LIKELY ARE YOU TO NOD OFF OR FALL ASLEEP WHILE SITTING AND READING: WOULD NEVER DOZE
HOW LIKELY ARE YOU TO NOD OFF OR FALL ASLEEP WHILE WATCHING TV: WOULD NEVER DOZE
SATISFACTION_WITH_CURRENT_SLEEP_PATTERN: SATISFIED
HOW LIKELY ARE YOU TO NOD OFF OR FALL ASLEEP WHILE LYING DOWN TO REST IN THE AFTERNOON WHEN CIRCUMSTANCES PERMIT: MODERATE CHANCE OF DOZING
WORRIED_DISTRESSED_DUE_TO_SLEEP: A LITTLE
SITING INACTIVE IN A PUBLIC PLACE LIKE A CLASS ROOM OR A MOVIE THEATER: WOULD NEVER DOZE
HOW LIKELY ARE YOU TO NOD OFF OR FALL ASLEEP IN A CAR, WHILE STOPPED FOR A FEW MINUTES IN TRAFFIC: SLIGHT CHANCE OF DOZING
DIFFICULTY_FALLING_ASLEEP: MILD
ESS-CHAD TOTAL SCORE: 3
SLEEP_PROBLEM_INTERFERES_DAILY_ACTIVITIES: A LITTLE
DIFFICULTY_STAYING_ASLEEP: MILD
HOW LIKELY ARE YOU TO NOD OFF OR FALL ASLEEP WHEN YOU ARE A PASSENGER IN A CAR FOR AN HOUR WITHOUT A BREAK: WOULD NEVER DOZE
HOW LIKELY ARE YOU TO NOD OFF OR FALL ASLEEP WHILE SITTING AND TALKING TO SOMEONE: WOULD NEVER DOZE
SLEEP_PROBLEM_NOTICEABLE_TO_OTHERS: A LITTLE

## 2024-10-08 ASSESSMENT — ENCOUNTER SYMPTOMS
OCCASIONAL FEELINGS OF UNSTEADINESS: 0
DEPRESSION: 0
LOSS OF SENSATION IN FEET: 0

## 2024-10-08 ASSESSMENT — PAIN SCALES - GENERAL: PAINLEVEL: 0-NO PAIN

## 2024-10-08 NOTE — ASSESSMENT & PLAN NOTE
home sleep study 2/10/22 showing moderate HARVEY with an AHI of 15,8 and SpO2 darwin of 74%.   CPAP titration was completed 2/25/22 -CPAP emergent Centrals were noted during titration. She used a Simplus FFM which she felt was comfortable. CPAP was titrated from 5 to 15 then changed to BiPAP with a back up rate from 20/16 to 23/16 with rate of 8. Recommendation is for auto CPAP at 15-18 no EPR or consideration with BiPAP S/T with 23/16 with BUR of 8 if PAP emergent centrals do not resolve  -Was set up with auto PAP per [facility]  -Well controlled on current settings  -Adjusted to 8-14 APAP for weight loss. She was agreeable. [facility] to assist with pressure change.   -Supply order updated today  -RTC 12 mo or sooner if needed

## 2024-10-08 NOTE — PATIENT INSTRUCTIONS
BP elevated today- recheck 130/80; preferable to be under 120/80     [facility]  [facility] [address]  [facility]  [address]  [place] [address]       NAME: Keya Moreno   DATE:  10/08/24    DIAGNOSIS:   1. HARVEY (obstructive sleep apnea)  Positive Airway Pressure (PAP) Therapy      2. BMI 40.0-44.9, adult (Multi)            Thank you for coming to the Sleep Medicine Clinic today! Your sleep medicine provider today was: Meaghan Marquez, RISA-CNP Below is a summary of your treatment plan, other important information, and our contact numbers:    TREATMENT PLAN:   - Follow-up in 12 months.  - If not already done, sign up for 'My Chart' and send prescription requests or messages through this    Annual Reminders About Your Sleep Apnea    Your sleep apnea is well controlled based on reviewing your PAP Data Report.     General Reminders:  Continue current machine settings. Continue using machine every night. Need at least 4 hours daily usage.   Remember to clean your mask, tubings, and water chamber regularly as instructed.  Know the replacement schedule of your supplies/ accessories and contact your DME (durable medical equipment) provider if you are due for them.  Avoid driving or operating heavy machinery when drowsy. A person driving while sleepy is 5 times more likely to have an accident. If you feel sleepy, pull over and take a short power nap (sleep for less than 30 minutes). Otherwise, ask somebody to drive you.    Follow-up sooner through MyChart or calling our office if you have any of the following symptoms:  Snoring or stopping breathing while using the machine  Recurrence of fatigue, sleepiness, insomnia, and other symptoms you had prior using machine  Persistent or worsening fatigue or sleepiness despite regular use of machine  Issues tolerating the machine like bloating sensation, air hunger, too much hot air, too much pressure, taking off mask  without recall in the middle of sleep, etc.     Other conservative measures to improve sleep apnea:  Losing weight can lead to some improvement of HARVEY which means you will need lower pressures in machine to control your HARVEY. In some patients, they don't need to use the machine after bariatric surgery. Hence, consider medical and/or surgical weight loss especially if your BMI is more than 35.  Avoiding alcohol, sedative-hypnotics, and sedating medications is imperative as these substances can worsen snoring and sleep apnea  If you have nasal congestion or seasonal allergies, improving your breathing through the nose is critical for treating sleep apnea, tolerating CPAP, and improving your sleep; hence, using intranasal steroid spray like Flonase might help. Make sure you know the proper way to use it.  Stay off your back when sleeping.    Common issues with PAP machine:  Mask gets dislodged when turning to the side: Consider getting a CPAP pillow or switching to a mask with hose on top.     Dry mouth:  Your machine has built-in humidifier that heats up the air to prevent dry mouth. It can be adjusted to your comfort. You can try that first and increase setting one level one night at a time to check which setting is comfortable and effective in lessening dry mouth. If dry mouth persists despite humidity setting adjustment, may apply OTC Biotene gel over the gums at bedtime.  If Biotene gel is not effective, consider trying XEROSTOM gel from [employer].  Also, eliminate or reduce dose of meds that can cause dry mouth if possible. Lastly, may try getting a separate room humidifier machine.    Airleaks: Please call DME as they may need to adjust your mask or refit you with a different kind of mask. In addition, you can ask DME for tips on getting a good mask seal and mask fit.     Difficulty tolerating the mask: Contact your DME to try a different kind of mask and/or call office to get a referral to Sleep Psychologist  "for CPAP desensitization. CPAP desensitization technique is a set of strategies that helps patient cope with claustrophobia and anxiety related to wearing mask. Alternatively, we can do a daytime mini-sleep study called PAP-nap trial wherein you will try on different kinds of mask and the sleep technician will try different pressure settings on CPAP and BPAP machines to see which specific pressure is tolerable and comfortable for you.     Water droplets or moisture within the hose and/or mask: This is called rain-out and it is caused by condensation of too much heated humidity on the cooler walls of the hose. If you have rain-out, turn down humidity settings or get a heated hose. If you already have a heated hose, turn up the \"tube temperature\" of the heated hose. Alternatively, if you don't want to get a heated hose or warmer air, may wrap the CPAP hose with stockings to keep it somewhat warm. Also, you need to place the machine on the floor and lower the hose so that water won't travel upward towards your mask.     You can also go to the following EDUCATION WEBSITES for further information:   American Academy of Sleep Medicine http://sleepeducation.org  National Sleep Foundation: https://sleepfoundation.org  American Sleep Apnea Association: https://www.sleepapnea.org (for patients with sleep apnea)    Here at [facility], we wish you a restful sleep!     Instructions - Common HARVEY Recs: - For your sleep apnea, continue to use your PAP every night and use it whenever you are sleeping.   - Avoid alcohol or sedatives several hours prior to sleeping.   - Get additional supplies for your PAP (e.g., mask, hose, filters) every 3 months or as your insurance allows from your [employer] company. Replacement cushions for your PAP mask can be requested monthly if airseals are an issue.  - Remember to clean your mask, tubings, and water chamber regularly as instructed.  - Avoid driving or operating heavy machinery when " drowsy. A person driving while sleepy is five (5) times more likely to have an accident. If you feel sleepy, pull over and take a short power nap (sleep for less than 30 minutes). Otherwise, ask somebody to drive you.    EASY WAYS TO IMPROVE YOUR SLEEP:  1. Go to bed and wake up at the same time every day.   Aim for 8 hours but some people need less, some need more.   Get out of bed if you are not sleeping.   Limit naps to 20 min or less.   2. Expose yourself to daylight and/ or bright light in the morning.   Go outside or spend time near a window each morning.   You can use a light box (found on Amazon) if you wake before the sunrise.   Limit light exposure in the evenings (including electronic usage).   Try meditation, reading, stretching, deep breathing, warm shower or bath, or yoga nidra as part of your bedtime routine. There are many great FREE, videos or audio tracks on [employer], etc for guidance.  3. Exercise, in some form, EVERY day, but not too close to bedtime. Consider making this part of your routine at the start of your day, followed by a cool shower.  4. Eat meals at roughly the same time every day. Make sure you are prioritizing fruits, vegetables, whole grains, lean proteins.  5. Time your caffeine intake. Make sure you are not drinking caffeine within 8 to 12 hours prior to your bedtime.   6. Avoid marijuana, alcohol, and nicotine. They will reduce sleep quality in any quantity.  7. Learn to manage anxiety. Psychology services at  can be reached at [phone] to schedule an appointment.     IMPORTANT INFORMATION:     Call 911 for medical emergencies.  Our offices are generally open from Monday-Friday, 9 am - 5 pm.  If you need to get in touch with me, you may either call me and my team(number is below) or you can use [employer].  If a referral for a test, for CPAP, or for another specialist was made, and you have not heard about scheduling this within a week, please call scheduling at  [phone] ([phone]).  If you are unable to make your appointment for clinic or an overnight study, kindly call the office at least 48 hours in advance to cancel and reschedule.  If you are on CPAP, please bring your device's card to each clinic appointment unless told otherwise by your provider.  There are no supporting services by either the sleep doctors or their staff on weekends and Holidays, or after 5 PM on weekdays.   If you have been asked to come to a sleep study, make sure you bring toiletries, a comfy pillow, and any nighttime medications that you may regularly take. Also be sure to eat dinner before you arrive. We generally do not provide meals.      PRESCRIPTIONS:  We require 7 days advanced notice for prescription refills. If we do not receive the request in this time, we cannot guarantee that your medication will be refilled in time. Please contact the sleep nurses listed below for refills or request via [employer].     IMPORTANT PHONE NUMBERS:   Sleep Medicine Clinic Fax: [phone]  Appointments (for Pediatric Sleep Clinic): [phone] - option 1  Appointments (for Adult Sleep Clinic): [phone] - option 2  Appointments (For Sleep Studies): [phone] - option 3  Behavioral Sleep Medicine: [phone]  Sleep Surgery: [phone]  ENT (Otolaryngology): [phone]  Headache Clinic (Neurology): [phone]  Neurology: [phone]  Psychiatry: [phone]  Pulmonary Function Testing (PFT) Center: [phone]  Pulmonary Medicine: [phone]  Ryan (DME): [phone]  [employer] (DME): [phone]  [facility] ([facility]): [phone]    Our Adult Sleep Medicine Team (Please do not hesitate to call the office or sleep nurse with any questions between appointments):    Adult Sleep Nurses (Mellissa Johnson, CLAUDIA and Isamar Aparicio RN):  For clinical questions and refilling prescriptions: [phone]  Email sleep diaries and other documents  at: [email or web address]    Adult Sleep Medicine Secretaries:  Argenis Campos (For Basilio/Amador/Krise/Strohl/Yeh): P: [phone]  F: [phone]  Lui Vanegas ([place])Office: [phone] option 4 Office Fax: [phone]  Iris Diego (For De León): P: [phone]  Fax: [phone]  Charline Hascary (For Jurcevic/Blank): P: [phone]  F: [phone]  Amena Khanna (For Raegan): P: [phone]  F: [phone]  Mckenna Nathan (For Bridget/Nohemi/Zakhary): P: [phone]  F: [phone]  Hanna Rader (For Kalin/Ramu): P: [phone]  F: [phone]     Adult Sleep Medicine Advanced Practice Providers:  Apolinar Romero ([place], [place])  Michelle Song ([facility])  Meaghan Marquez CNP ([place])  Bailey Manrique CNP ([place], [place], [place])  Melva Carlin (Replaced by [facility], [place], [place])  Alanis Guallpa CNP ([facility])      Our Sleep Testing Center (STC) Locations:  Our team will contact you to schedule your sleep study, however, you can contact us as follow:  Main Phone Line (scheduling only): [phone]), option 3  Adult and Pediatric Locations  [facility] (6 years and older): [facility] - 4th floor ([address] [place] [address]) After hours line: [phone]  [facility] (Main campus: All ages): [facility], 6th floor. After hours line: [phone]  [facility] (5 years and older; younger considered on case-by-case basis): [address]; [address]. Scheduling  After hours line: [phone]   [place] (6 years and older): [address]; [address]; [address]   [place] (6 years and older): [address] [facility], [address]  After hours line: [phone]   [place] (13 years and older) in [place]: [address], 2nd floor  After hours line: [phone]  [facility] (13 year and older): [address] [facility] [address]   "After hours line: [phone] (Home studies out of [facility])    Adult Only Locations:   [place] (18 years and older): [address] [place] [address], 2nd floor   [place] (18 years and older): [address] [facility]; 4th floor  After hours line: [phone]   [place] (18 years and older) at [place]: [address] [place] [address]  After hours line: [phone]        CONTACTING YOUR SLEEP MEDICINE PROVIDER:  Send a message directly to your provider through \"My Chart\", which is the email service through your  Records Account: [email or web address]:// [email or web address]   Call [phone] and leave a message. One of the administrative assistants will forward the message to your sleep medicine provider through \"My Chart\" and/or email.     Your sleep medicine provider for this visit was: Meaghan Marquez, APRN-CNP    In the event that you are running more than 15 minutes late to your appointment, I will kindly ask you to reschedule.       "

## 2024-10-08 NOTE — PROGRESS NOTES
Patient: Domingo Moreno    [id number]  : 1990 -- AGE 34 y.o.    Provider: KITTY German     Location [facility]   Service Date: 10/8/2024              [facility]  Followup Visit Note    HISTORY OF PRESENT ILLNESS       HISTORY OF PRESENT ILLNESS   Domingo Moreno is a 34 y.o. female with past medical history of ADD, anxiety, asthma, obesity, depression, hyperlipidemia, IBS who presents to a [facility] for followup. 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     PAST SLEEP HISTORY  DOMINGO has had a home sleep study completed on 2/10/22 showing moderate HARVEY with an AHI of 15,8 and SpO2 darwin of 74%. Recommendation was for auto CPAP or dental appliance. She completed an in-lab titration study on 22. CPAP emergent Centrals were noted during titration. She used a Simplus FFM which she felt was comfortable. CPAP was titrated from 5 to 15 then changed to BiPAP with a back up rate from 20/16 to /16 with rate of 8. Recommendation is for auto CPAP at 15-18 no EPR or consideration with BiPAP S/T with 23/16 with BUR of 8 if PAP emergent centrals do not resolve in 3 months after use     CURRENT SLEEP HISTORY    On today's visit, the patient reports doing well on PAP machine. Feels she cannot fall asleep without it. Notes straps sometimes feel like they exacerbate her migraines. She is using a sock or strap liners to disperse the pressure better. Notes she is losing weight. Had to purchase supplies online recently as her Rx was . Air pressure feeling comfortable. Notes the modem is still plugged in but fell off the back of the machine.     RLS Followup:  denies     Insomnia follow up:  Bedtime: 12 midnight  Sleep Latency: 30 min  Awakenings:  Wake time: 9 AM  Naps:   3 PM for 1.5 hours    ESS: 3   MARLEY: 6  FOSQ: 34    REVIEW OF SYSTEMS     REVIEW OF SYSTEMS  Review of Systems   All other  systems reviewed and are negative.      ALLERGIES AND MEDICATIONS     ALLERGIES  Allergies   Allergen Reactions    House Dust Mite Itching    Pollen Extracts Hives and Unknown     Dust mites, trees, grasses, weeds and ragweed verified through skin testing    Tree And Shrub Pollen Itching       MEDICATIONS  Current Outpatient Medications   Medication Sig Dispense Refill    acetaminophen (Tylenol) 325 mg capsule Take by mouth.      albuterol (ProAir HFA) 90 mcg/actuation inhaler Inhale 2 puffs if needed for wheezing. 18 g 1    butalbital-acetaminophen-caff -40 mg tablet Take 1 tablet by mouth every 4 hours if needed.      hydroxychloroquine (Plaquenil) 200 mg tablet Take 1 tablet (200 mg) by mouth 2 times daily (morning and late afternoon).      inulin 2 gram tablet,chewable Chew 4 g once daily.      norethindrone-e.estradioL-iron (Lo Loestrin Fe) 1 mg-10 mcg (24)/10 mcg (2) tablet Take 1 tablet by mouth once daily.      omeprazole OTC (PriLOSEC OTC) 20 mg EC tablet Take 1 tablet (20 mg) by mouth once daily at bedtime.      sertraline (Zoloft) 25 mg tablet Take 1 tablet (25 mg) by mouth once daily.      tretinoin (Retin-A) 0.025 % cream Apply a pea sized drop to affected areas of skin every night.       No current facility-administered medications for this visit.       PPAST MEDICAL HISTORY  Past Medical History:   Diagnosis Date    ADHD (attention deficit hyperactivity disorder)     Allergic     Anxiety     Arthritis     Asthma     Cellulitis of unspecified part of limb 07/07/2014    Cellulitis of leg    Depression, major, recurrent, mild (CMS-HCC) 02/14/2024    Disorder of the skin and subcutaneous tissue, unspecified 07/14/2017    Skin lesion    Encounter for screening for malignant neoplasm of colon     Encounter for screening colonoscopy    Encounter for supervision of normal pregnancy, unspecified, unspecified trimester 03/08/2019    Intrauterine pregnancy    Headache     Hyperlipidemia 02/14/2024    Other  "chronic diseases of tonsils and adenoids 06/09/2017    Tonsil stone    Other conditions influencing health status     Stillbirth of single fetus    Personal history of other drug therapy     History of influenza vaccination    Personal history of other medical treatment     History of cardiac monitoring    Personal history of other medical treatment     History of pulmonary function tests    Personal history of other medical treatment     History of cardiac monitoring    Personal history of other medical treatment     History of echocardiogram    Personal history of other specified conditions 03/08/2019    History of dysuria    Seasonal allergic rhinitis due to pollen 04/30/2021    Sleep disorder 02/14/2024       PAST SURGICAL HISTORY:  Past Surgical History:   Procedure Laterality Date    APPENDECTOMY  06/2024    Dr Griffin    OTHER SURGICAL HISTORY  05/13/2021    Colonoscopy complete for polypectomy    OTHER SURGICAL HISTORY  07/23/2018    Limits & Risks Obstetric: Stillbirth       FAMILY HISTORY  Family History   Problem Relation Name Age of Onset    Hypertension Mother Haily     Hypertension Father Teto     Diabetes Father Teto     Heart disease Father Teto     Breast cancer Maternal Grandmother Lyndsay     Heart disease Paternal Grandfather Rufus     Heart disease Paternal Grandmother Adelaide     Cancer Mother's Brother Luis        FAMILY HISTORY: No changes since previous visit. Otherwise non-contributory as charted.       SOCIAL HISTORY  She  reports that she has never smoked. She has never used smokeless tobacco. She reports that she does not currently use alcohol. She reports that she does not use drugs.       PHYSICAL EXAM     VITAL SIGNS: /86 (BP Location: Right arm, Patient Position: Sitting, BP Cuff Size: Adult)   Pulse 90   Ht 1.727 m (5' 8\")   Wt 116 kg (256 lb)   SpO2 95%   BMI 38.92 kg/m²      PREVIOUS WEIGHTS:  Wt Readings from Last 3 Encounters:   10/08/24 116 kg (256 lb)   08/28/24 119 " "kg (263 lb 6.4 oz)   06/11/24 120 kg (264 lb 15.9 oz)       Physical Exam  Physical Exam   Constitutional: Alert and oriented, cooperative, no obvious distress   HEENT: Non icteric or anemic, EOM WNL bilaterally   Neck: Supple, no JVD, no goiter, no adenopathy, no rigidity  Chest: CTA bilaterally, no wheezing, crackles, rubs   Cardiac: RRR, S1 and S2, no murmur, rub, thrill   Abdomen: Obese, Soft, nontender, no masses, no organomegaly   Extremities: No clubbing, no LL edema   Neuromuscular: Cranial nerves grossly intact, no focal deficits      RESULTS/DATA     No results found for: \"CO2\", \"IRON\", \"TRANSFERRIN\", \"IRONSAT\", \"TIBC\", \"FERRITIN\"    No results found for this or any previous visit from the past 365 days.         PAP Adherence:      Sanaz II- 6/29/2022    F20 small- 6/11/2024      ASSESSMENT/PLAN     Ms. Moreno is a 34 y.o. female and She returns in followup to the [facility] for HARVEY.    Problem List and Orders  Problem List Items Addressed This Visit             ICD-10-CM    HARVEY (obstructive sleep apnea) - Primary G47.33     home sleep study 2/10/22 showing moderate HARVEY with an AHI of 15,8 and SpO2 darwin of 74%.   CPAP titration was completed 2/25/22 -CPAP emergent Centrals were noted during titration. She used a Simplus FFM which she felt was comfortable. CPAP was titrated from 5 to 15 then changed to BiPAP with a back up rate from 20/16 to 23/16 with rate of 8. Recommendation is for auto CPAP at 15-18 no EPR or consideration with BiPAP S/T with 23/16 with BUR of 8 if PAP emergent centrals do not resolve  -Was set up with auto PAP per [facility]  -Well controlled on current settings  -Adjusted to 8-14 APAP for weight loss. She was agreeable. [facility] to assist with pressure change.   -Supply order updated today  -RTC 12 mo or sooner if needed            Relevant Orders    Positive Airway Pressure (PAP) Therapy    BMI 38.0-38.9,adult Z68.38     Weight loss recommended  Follow up with " PCP for recommendations              Disposition    Return to clinic in 12 months

## 2024-10-24 PROBLEM — Z00.00 WELL ADULT EXAM: Chronic | Status: ACTIVE | Noted: 2024-10-24

## 2024-10-24 NOTE — PROGRESS NOTES
Subjective      HPI:    Pt would like flu vaccine      Keya Moreno is a 34 y.o. female 34 y.o. is here today for PE/health maintenance      Chief Complaint   Patient presents with    Annual Exam           Pt also due for f/up chronic medical problems-allergies, asthma , cholesterol , vit d , elevated BMI     Will now manage the zoloft         Scales reviewed        Patient Health Questionnaire-2 Score: 0 (11/04/24 0921)         Other medical specialists are involved in patient's care.    Any recent notes that were available were reviewed.    All conditions are monitored, evaluated and assessed regularly.    Patient is compliant with current treatment regimen/medications.    Specialists involved include:      OB/GYN-  and psychiatry Dr Chaparro - ADD, anxiety , depression   No longer Manages zoloft    Pt had Tdap in 2023 at OB     Dr Marquez- [facility] with CPAP - working well per pt      Dr Griffin- 7/17/24 had appendectomy- appendocele/mucocele     Bailey Harmon, CNP- Neuro - migraines             USPTFS recommend  laboratory  screening for HIV in patients ages 15-65  Discussed     Health Maintenance Topics       Topic Date     Yearly Adult Physical today    HIV Screening     DTaP/Tdap/Td Vaccines Discussed      Health Maintenance Topics with due status: Not Due       Topic Last Completion Date    Cervical Cancer Screening- per GYN  08/28/2023     Health Maintenance Topics with due status: Completed       Topic Last Completion Date    Hepatitis C Screening 01/16/2023     Health Maintenance Topics with due status: Aged Out       Topic Date Due    HIB Vaccines Aged Out    IPV Vaccines Aged Out    Meningococcal Vaccine Aged Out    Rotavirus Vaccines Aged Out    HPV Vaccines Aged Out         Doing great with weight - has stopped breast feeding     Kids 5 yr and 15 months     Will start  next year             Immunization History   Administered Date(s) Administered    Flu vaccine (IIV4), preservative  free *Check age/dose* 09/01/2017, 10/11/2019, 09/08/2020, 09/21/2021, 10/13/2022    Hep B, 20 yrs and older, Dialysis (RECOMBIVAX) 05/01/2007    Hepatitis B vaccine, 19 yrs and under (RECOMBIVAX, ENGERIX) 04/26/2006    Influenza, Unspecified 10/13/2022    Pfizer Purple Cap SARS-CoV-2 03/26/2021, 04/16/2021, 11/30/2021    Pneumococcal conjugate vaccine, 13-valent (PREVNAR 13) 10/21/2015         Social History     Tobacco Use   Smoking Status Never   Smokeless Tobacco Never         Social History     Substance and Sexual Activity   Alcohol Use Not Currently                    Current Outpatient Medications:     acetaminophen (Tylenol) 325 mg capsule, Take by mouth., Disp: , Rfl:     albuterol 90 mcg/actuation inhaler, INHALE 2 PUFFS AS NEEDED FOR WHEEZING, Disp: 8.5 g, Rfl: 1    hydroxychloroquine (Plaquenil) 200 mg tablet, Take 1 tablet (200 mg) by mouth 2 times daily (morning and late afternoon)., Disp: , Rfl:     inulin 2 gram tablet,chewable, Chew 4 g once daily., Disp: , Rfl:     omeprazole OTC (PriLOSEC OTC) 20 mg EC tablet, Take 1 tablet (20 mg) by mouth once daily at bedtime., Disp: , Rfl:     sertraline (Zoloft) 25 mg tablet, Take 1 tablet (25 mg) by mouth once daily., Disp: , Rfl:     tretinoin (Retin-A) 0.025 % cream, Apply a pea sized drop to affected areas of skin every night., Disp: , Rfl:       Review of Systems      Review of Systems        Objective        PE:          Visit Vitals  /76 (BP Location: Left arm, Patient Position: Sitting, BP Cuff Size: Large adult)   Pulse 89   Temp 36.2 °C (97.1 °F) (Temporal)   Resp 12   Wt 113 kg (249 lb 9.6 oz)   LMP 11/01/2024 (Approximate)   SpO2 96%   BMI 37.95 kg/m²   OB Status Having periods   Smoking Status Never   BSA 2.33 m²      Patient's last menstrual period was 11/01/2024 (approximate).               Pt is A and O x3, NAD  Head- normocephalic and atraumatic,   EYES- conjunctiva- normal   lids- normal  EARS/NOSE- TM's normal, nasopharynx- normal and  atraumatic  OROPHARYNX- normal  NECK- supple, FROM  THYROID- NT, normal size, no nodule noted  LYMPH- no cervical lymph nodes palpated   CV- RRR without murmur  PULM- CTA bilaterally, normal respiratory effort  RESPIRATORY EFFORT- normal , no retractions or nasal flaring   ABD- normoactive BS's , soft , NT, no hepatosplenomegaly palpated  EXT- no edema,NT  SKIN- no abnormal skin lesions noted  NEURO- no focal deficits  PSYCH- pleasant, normal judgement and insight    BP Readings from Last 3 Encounters:   11/04/24 110/76   10/08/24 160/86   08/28/24 99/83         Wt Readings from Last 3 Encounters:   11/04/24 113 kg (249 lb 9.6 oz)   10/08/24 116 kg (256 lb)   08/28/24 119 kg (263 lb 6.4 oz)         BMI Readings from Last 3 Encounters:   11/04/24 37.95 kg/m²   10/08/24 38.92 kg/m²   08/28/24 40.05 kg/m²       The number and complexity of problems addressed is considered moderate.  The amount and/or complexity of data reviewed and analyzed is considered moderate. The risk of complications and/or morbidity/mortality of patient is considered moderate. Overall, this patient encounter is considered a moderate risk visit.        Assessment/Plan      Assessment & Plan  Well adult exam         Allergic rhinitis, unspecified seasonality, unspecified trigger         Mild intermittent asthma without complication ([facility])  Albuterol as needed       Hypercholesteremia  Diet and exercise        Vitamin D deficiency         BMI 38.0-38.9,adult  Patient's BMI is elevated.  Plan- diet and exercise- BMI is elevated. Need to increase activity on a daily basis especially walking.  Monitor  total calories per day- decrease carbohydrates and fats. Goal - lose 1-2 pounds per week.    Recommend 150 minutes of moderate-intensity exercise as tolerated per week and 2-3 days of resistance, flexibility, and neuromotor exercises per week.    Normal BMI- 18.5-25    Overweight=  BMI 26-29    Obese= BMI 30-39    Morbidly Obese = BMI >40            Screening for diabetes mellitus         Screening for HIV (human immunodeficiency virus)         Hypercholesterolemia         Recurrent major depressive disorder, in full remission (CMS-[facility])    Orders:    Follow Up In [facility] - PCP - Established    Encounter for administration of vaccine                    Follow up 12 months - PE            Recommendations for women annual wellness exam:   Make sure screenings for cervical and breast cancer are up to date if applicable- pap smears age 21-65  mammogram starting at age 35- 40 or sooner if positive family history of breast cancer   colonoscopy at age 45, earlier if positive family history for breast or colon cancer   Screen for osteoporosis with DXA bone scan starting at age 65 or sooner if risk factors present (long term steroid use, smoking, heavy alcohol use, history of fracture, rheumatoid arthritis, low body weight, family history of hip fracture)  Screening for lung cancer with low-dose CT in all adults age 50-80 who have a 20 pack-year smoking history and currently smoke or have quit within the past 15 years; and are symptom free/no prior pulmonary diagnosis  Gardisil vaccine age 9-26 years of age   Follow a healthy diet (Examples, Dash diet, Mediterranean diet)  Exercise 150 minutes per week   Maintain healthy weight (BMI < 25)  Do not smoke   Alcohol in moderation (up to 1 drink/day)  Get enough sleep (7-8 hours/night)  Take a prenatal vitamin with folic acid if possibility of pregnancy   Make sure immunizations are up to date   Recommend minimum 1,000 mg calcium and 600-800 IU vitamin D daily (combination of diet + supplement)- unless there is a contraindication   Visit dentist twice yearly      If you are less than 60 years old, have diabetes mellitus, or chronic kidney disease, your goal blood pressure is < 140/90.  If you are older than 60 years old and do not have diabetes or kidney disease, your goal blood pressure is < 150/90.

## 2024-10-25 DIAGNOSIS — J45.20 MILD INTERMITTENT ASTHMA WITHOUT COMPLICATION (HHS-HCC): ICD-10-CM

## 2024-10-25 RX ORDER — ALBUTEROL SULFATE 90 UG/1
2 INHALANT RESPIRATORY (INHALATION) AS NEEDED
Qty: 8.5 G | Refills: 1 | Status: SHIPPED | OUTPATIENT
Start: 2024-10-25

## 2024-11-04 ENCOUNTER — LAB (OUTPATIENT)
Dept: LAB | Facility: LAB | Age: 34
End: 2024-11-04
Payer: COMMERCIAL

## 2024-11-04 ENCOUNTER — APPOINTMENT (OUTPATIENT)
Dept: PRIMARY CARE | Facility: CLINIC | Age: 34
End: 2024-11-04
Payer: COMMERCIAL

## 2024-11-04 VITALS
DIASTOLIC BLOOD PRESSURE: 76 MMHG | RESPIRATION RATE: 12 BRPM | TEMPERATURE: 97.1 F | HEART RATE: 89 BPM | SYSTOLIC BLOOD PRESSURE: 110 MMHG | OXYGEN SATURATION: 96 % | BODY MASS INDEX: 37.95 KG/M2 | WEIGHT: 249.6 LBS

## 2024-11-04 DIAGNOSIS — E55.9 VITAMIN D DEFICIENCY: Chronic | ICD-10-CM

## 2024-11-04 DIAGNOSIS — E78.00 HYPERCHOLESTEROLEMIA: Chronic | ICD-10-CM

## 2024-11-04 DIAGNOSIS — E78.00 HYPERCHOLESTEREMIA: Chronic | ICD-10-CM

## 2024-11-04 DIAGNOSIS — Z11.4 SCREENING FOR HIV (HUMAN IMMUNODEFICIENCY VIRUS): Chronic | ICD-10-CM

## 2024-11-04 DIAGNOSIS — Z23 ENCOUNTER FOR ADMINISTRATION OF VACCINE: Chronic | ICD-10-CM

## 2024-11-04 DIAGNOSIS — Z00.00 WELL ADULT EXAM: Primary | Chronic | ICD-10-CM

## 2024-11-04 DIAGNOSIS — F41.9 ANXIETY, MILD: ICD-10-CM

## 2024-11-04 DIAGNOSIS — Z13.1 SCREENING FOR DIABETES MELLITUS: Chronic | ICD-10-CM

## 2024-11-04 DIAGNOSIS — J30.9 ALLERGIC RHINITIS, UNSPECIFIED SEASONALITY, UNSPECIFIED TRIGGER: Chronic | ICD-10-CM

## 2024-11-04 DIAGNOSIS — J45.20 MILD INTERMITTENT ASTHMA WITHOUT COMPLICATION (HHS-HCC): Chronic | ICD-10-CM

## 2024-11-04 DIAGNOSIS — F33.42 RECURRENT MAJOR DEPRESSIVE DISORDER, IN FULL REMISSION (CMS-HCC): Chronic | ICD-10-CM

## 2024-11-04 LAB
25(OH)D3 SERPL-MCNC: 23 NG/ML (ref 30–100)
ALBUMIN SERPL BCP-MCNC: 4.3 G/DL (ref 3.4–5)
ALP SERPL-CCNC: 66 U/L (ref 33–110)
ALT SERPL W P-5'-P-CCNC: 11 U/L (ref 7–45)
AST SERPL W P-5'-P-CCNC: 11 U/L (ref 9–39)
BILIRUB DIRECT SERPL-MCNC: 0.1 MG/DL (ref 0–0.3)
BILIRUB SERPL-MCNC: 0.4 MG/DL (ref 0–1.2)
CHOLEST SERPL-MCNC: 170 MG/DL (ref 0–199)
CHOLESTEROL/HDL RATIO: 4.7
GLUCOSE SERPL-MCNC: 95 MG/DL (ref 74–99)
HDLC SERPL-MCNC: 36.5 MG/DL
HIV 1+2 AB+HIV1 P24 AG SERPL QL IA: NONREACTIVE
LDLC SERPL CALC-MCNC: 113 MG/DL
NON HDL CHOLESTEROL: 134 MG/DL (ref 0–149)
PROT SERPL-MCNC: 7 G/DL (ref 6.4–8.2)
TRIGL SERPL-MCNC: 103 MG/DL (ref 0–149)
VLDL: 21 MG/DL (ref 0–40)

## 2024-11-04 PROCEDURE — 1036F TOBACCO NON-USER: CPT | Performed by: FAMILY MEDICINE

## 2024-11-04 PROCEDURE — 36415 COLL VENOUS BLD VENIPUNCTURE: CPT

## 2024-11-04 PROCEDURE — 82306 VITAMIN D 25 HYDROXY: CPT

## 2024-11-04 PROCEDURE — 80061 LIPID PANEL: CPT

## 2024-11-04 PROCEDURE — 82947 ASSAY GLUCOSE BLOOD QUANT: CPT

## 2024-11-04 PROCEDURE — 99395 PREV VISIT EST AGE 18-39: CPT | Performed by: FAMILY MEDICINE

## 2024-11-04 PROCEDURE — 87389 HIV-1 AG W/HIV-1&-2 AB AG IA: CPT

## 2024-11-04 PROCEDURE — 90471 IMMUNIZATION ADMIN: CPT | Performed by: FAMILY MEDICINE

## 2024-11-04 PROCEDURE — 80076 HEPATIC FUNCTION PANEL: CPT

## 2024-11-04 PROCEDURE — 90656 IIV3 VACC NO PRSV 0.5 ML IM: CPT | Performed by: FAMILY MEDICINE

## 2024-11-04 RX ORDER — SERTRALINE HYDROCHLORIDE 25 MG/1
25 TABLET, FILM COATED ORAL DAILY
Qty: 30 TABLET | Refills: 1 | Status: SHIPPED | OUTPATIENT
Start: 2024-11-04

## 2024-11-04 ASSESSMENT — PATIENT HEALTH QUESTIONNAIRE - PHQ9
SUM OF ALL RESPONSES TO PHQ9 QUESTIONS 1 AND 2: 0
9. THOUGHTS THAT YOU WOULD BE BETTER OFF DEAD, OR OF HURTING YOURSELF: NOT AT ALL
7. TROUBLE CONCENTRATING ON THINGS, SUCH AS READING THE NEWSPAPER OR WATCHING TELEVISION: SEVERAL DAYS
8. MOVING OR SPEAKING SO SLOWLY THAT OTHER PEOPLE COULD HAVE NOTICED. OR THE OPPOSITE, BEING SO FIGETY OR RESTLESS THAT YOU HAVE BEEN MOVING AROUND A LOT MORE THAN USUAL: NOT AT ALL
SUM OF ALL RESPONSES TO PHQ QUESTIONS 1-9: 4
SUM OF ALL RESPONSES TO PHQ9 QUESTIONS 1 AND 2: 0
4. FEELING TIRED OR HAVING LITTLE ENERGY: SEVERAL DAYS
1. LITTLE INTEREST OR PLEASURE IN DOING THINGS: NOT AT ALL
2. FEELING DOWN, DEPRESSED OR HOPELESS: NOT AT ALL
10. IF YOU CHECKED OFF ANY PROBLEMS, HOW DIFFICULT HAVE THESE PROBLEMS MADE IT FOR YOU TO DO YOUR WORK, TAKE CARE OF THINGS AT HOME, OR GET ALONG WITH OTHER PEOPLE: SOMEWHAT DIFFICULT
3. TROUBLE FALLING OR STAYING ASLEEP OR SLEEPING TOO MUCH: SEVERAL DAYS
5. POOR APPETITE OR OVEREATING: SEVERAL DAYS
2. FEELING DOWN, DEPRESSED OR HOPELESS: NOT AT ALL
6. FEELING BAD ABOUT YOURSELF - OR THAT YOU ARE A FAILURE OR HAVE LET YOURSELF OR YOUR FAMILY DOWN: NOT AT ALL
1. LITTLE INTEREST OR PLEASURE IN DOING THINGS: NOT AT ALL

## 2024-11-04 ASSESSMENT — ANXIETY QUESTIONNAIRES
IF YOU CHECKED OFF ANY PROBLEMS ON THIS QUESTIONNAIRE, HOW DIFFICULT HAVE THESE PROBLEMS MADE IT FOR YOU TO DO YOUR WORK, TAKE CARE OF THINGS AT HOME, OR GET ALONG WITH OTHER PEOPLE: SOMEWHAT DIFFICULT
1. FEELING NERVOUS, ANXIOUS, OR ON EDGE: SEVERAL DAYS
3. WORRYING TOO MUCH ABOUT DIFFERENT THINGS: SEVERAL DAYS
5. BEING SO RESTLESS THAT IT IS HARD TO SIT STILL: NOT AT ALL
7. FEELING AFRAID AS IF SOMETHING AWFUL MIGHT HAPPEN: SEVERAL DAYS
6. BECOMING EASILY ANNOYED OR IRRITABLE: SEVERAL DAYS
4. TROUBLE RELAXING: NOT AT ALL
GAD7 TOTAL SCORE: 5
2. NOT BEING ABLE TO STOP OR CONTROL WORRYING: SEVERAL DAYS

## 2024-11-08 ENCOUNTER — TELEPHONE (OUTPATIENT)
Dept: PRIMARY CARE | Facility: CLINIC | Age: 34
End: 2024-11-08
Payer: COMMERCIAL

## 2024-11-21 ENCOUNTER — LAB (OUTPATIENT)
Dept: LAB | Facility: LAB | Age: 34
End: 2024-11-21
Payer: COMMERCIAL

## 2024-11-21 DIAGNOSIS — R10.32 LEFT LOWER QUADRANT PAIN: Primary | ICD-10-CM

## 2024-11-21 LAB
ALBUMIN SERPL BCP-MCNC: 4.3 G/DL (ref 3.4–5)
ALP SERPL-CCNC: 64 U/L (ref 33–110)
ALT SERPL W P-5'-P-CCNC: 8 U/L (ref 7–45)
ANION GAP SERPL CALC-SCNC: 11 MMOL/L (ref 10–20)
AST SERPL W P-5'-P-CCNC: 13 U/L (ref 9–39)
BASOPHILS # BLD AUTO: 0.02 X10*3/UL (ref 0–0.1)
BASOPHILS NFR BLD AUTO: 0.3 %
BILIRUB SERPL-MCNC: 0.6 MG/DL (ref 0–1.2)
BUN SERPL-MCNC: 11 MG/DL (ref 6–23)
CALCIUM SERPL-MCNC: 9.3 MG/DL (ref 8.6–10.6)
CHLORIDE SERPL-SCNC: 104 MMOL/L (ref 98–107)
CO2 SERPL-SCNC: 28 MMOL/L (ref 21–32)
CREAT SERPL-MCNC: 0.69 MG/DL (ref 0.5–1.05)
EGFRCR SERPLBLD CKD-EPI 2021: >90 ML/MIN/1.73M*2
EOSINOPHIL # BLD AUTO: 0.1 X10*3/UL (ref 0–0.7)
EOSINOPHIL NFR BLD AUTO: 1.4 %
ERYTHROCYTE [DISTWIDTH] IN BLOOD BY AUTOMATED COUNT: 13.7 % (ref 11.5–14.5)
GLUCOSE SERPL-MCNC: 88 MG/DL (ref 74–99)
HCT VFR BLD AUTO: 42.2 % (ref 36–46)
HGB BLD-MCNC: 13.5 G/DL (ref 12–16)
IMM GRANULOCYTES # BLD AUTO: 0.02 X10*3/UL (ref 0–0.7)
IMM GRANULOCYTES NFR BLD AUTO: 0.3 % (ref 0–0.9)
LIPASE SERPL-CCNC: 12 U/L (ref 9–82)
LYMPHOCYTES # BLD AUTO: 2.23 X10*3/UL (ref 1.2–4.8)
LYMPHOCYTES NFR BLD AUTO: 31.1 %
MAGNESIUM SERPL-MCNC: 2.1 MG/DL (ref 1.6–2.4)
MCH RBC QN AUTO: 28.4 PG (ref 26–34)
MCHC RBC AUTO-ENTMCNC: 32 G/DL (ref 32–36)
MCV RBC AUTO: 89 FL (ref 80–100)
MONOCYTES # BLD AUTO: 0.43 X10*3/UL (ref 0.1–1)
MONOCYTES NFR BLD AUTO: 6 %
NEUTROPHILS # BLD AUTO: 4.38 X10*3/UL (ref 1.2–7.7)
NEUTROPHILS NFR BLD AUTO: 60.9 %
NRBC BLD-RTO: 0 /100 WBCS (ref 0–0)
PLATELET # BLD AUTO: 265 X10*3/UL (ref 150–450)
POTASSIUM SERPL-SCNC: 4 MMOL/L (ref 3.5–5.3)
PROT SERPL-MCNC: 6.6 G/DL (ref 6.4–8.2)
RBC # BLD AUTO: 4.75 X10*6/UL (ref 4–5.2)
SODIUM SERPL-SCNC: 139 MMOL/L (ref 136–145)
WBC # BLD AUTO: 7.2 X10*3/UL (ref 4.4–11.3)

## 2024-11-21 PROCEDURE — 80053 COMPREHEN METABOLIC PANEL: CPT

## 2024-11-21 PROCEDURE — 83735 ASSAY OF MAGNESIUM: CPT

## 2024-11-21 PROCEDURE — 36415 COLL VENOUS BLD VENIPUNCTURE: CPT

## 2024-11-21 PROCEDURE — 85025 COMPLETE CBC W/AUTO DIFF WBC: CPT

## 2024-11-21 PROCEDURE — 83690 ASSAY OF LIPASE: CPT

## 2024-12-30 ENCOUNTER — OFFICE (OUTPATIENT)
Dept: URBAN - METROPOLITAN AREA CLINIC 26 | Facility: CLINIC | Age: 34
End: 2024-12-30
Payer: COMMERCIAL

## 2024-12-30 VITALS
TEMPERATURE: 98.3 F | WEIGHT: 245 LBS | DIASTOLIC BLOOD PRESSURE: 76 MMHG | HEIGHT: 68 IN | HEART RATE: 83 BPM | SYSTOLIC BLOOD PRESSURE: 118 MMHG

## 2024-12-30 DIAGNOSIS — R93.2 ABNORMAL FINDINGS ON DIAGNOSTIC IMAGING OF LIVER AND BILIARY: ICD-10-CM

## 2024-12-30 DIAGNOSIS — R10.32 LEFT LOWER QUADRANT PAIN: ICD-10-CM

## 2024-12-30 DIAGNOSIS — K57.30 DIVERTICULOSIS OF LARGE INTESTINE WITHOUT PERFORATION OR ABS: ICD-10-CM

## 2024-12-30 PROCEDURE — 99214 OFFICE O/P EST MOD 30 MIN: CPT | Performed by: NURSE PRACTITIONER

## 2025-01-21 DIAGNOSIS — F41.9 ANXIETY, MILD: ICD-10-CM

## 2025-01-22 RX ORDER — SERTRALINE HYDROCHLORIDE 25 MG/1
25 TABLET, FILM COATED ORAL DAILY
Qty: 30 TABLET | Refills: 11 | Status: SHIPPED | OUTPATIENT
Start: 2025-01-22

## 2025-02-28 ENCOUNTER — TELEPHONE (OUTPATIENT)
Dept: PRIMARY CARE | Facility: CLINIC | Age: 35
End: 2025-02-28
Payer: COMMERCIAL

## 2025-02-28 NOTE — TELEPHONE ENCOUNTER
Spoke with pt and explained in detail the msg.  She understood and will reach out to Dr. Harding.  Rheum also told her to schedule with Hematology, she will call to schedule with them as well

## 2025-02-28 NOTE — TELEPHONE ENCOUNTER
FW: ER Follow-up  Received: Today  FELICITAS Borja MD  Replies will be sent to [facility] Do Vazquez[address] [place] [address] Clinical Support Staff  Phone Number: [phone]            Previous Messages    ER Follow-up  (Newest Message First)  View All Conversations on this Encounter  Replies will be sent to MARTA James[address] [place] [address] Clinical Support Staff   Millicent Sandoval LPN routed conversation to You2 minutes ago (9:15 AM)     Keya LOZANO Do [place] [id number] Clinical Support Staff (supporting You)41 minutes ago (8:36 AM)       I wanted to follow up after my recent ER visit regarding my ongoing symptoms and abnormal findings. I called earlier and left a message to schedule an appointment, but I wanted to provide an overview of what’s been going on.     I’ve been dealing with nausea, bloating, and sharp left-sided pain that worsens before passing gas. I also have fatigue, chills (without fever), and a general feeling of being unwell. My appetite has been very low, and I’ve barely eaten since Tuesday, though I can keep water down.     At the ER, a CT scan showed an enlarged spleen, which was also noted back in June. I think my bloodwork at the ER was normal, but I’m still feeling off. However, the lab and imaging results haven’t been released to me in [place] yet. My previous labs have also shown:                     Mildly elevated CRP (inflammation marker)                     A history of low hemoglobin and RBC counts (previously flagged as low, but improved recently)                     Low CO2 and slightly high chloride on metabolic panels                     Vitamin D deficiency (currently supplementing)     I’m concerned about my persistent symptoms, the repeated finding of an enlarged spleen, and the frequency of CT scans (I’ve had four in the past year, including this most recent one). I’d like to discuss what next steps might be needed, whether further testing is warranted, and if there are alternative  ways to monitor the spleen without repeated CT scans.     Please let me know when I can get in for an appointment or if there’s anything I should do in the meantime.

## 2025-03-04 ENCOUNTER — TELEPHONE (OUTPATIENT)
Dept: PRIMARY CARE | Facility: CLINIC | Age: 35
End: 2025-03-04

## 2025-03-06 ENCOUNTER — OFFICE (OUTPATIENT)
Dept: URBAN - METROPOLITAN AREA CLINIC 27 | Facility: CLINIC | Age: 35
End: 2025-03-06
Payer: COMMERCIAL

## 2025-03-06 VITALS
WEIGHT: 229 LBS | SYSTOLIC BLOOD PRESSURE: 114 MMHG | DIASTOLIC BLOOD PRESSURE: 78 MMHG | HEART RATE: 82 BPM | HEIGHT: 68 IN | TEMPERATURE: 98.5 F

## 2025-03-06 DIAGNOSIS — R11.2 NAUSEA WITH VOMITING, UNSPECIFIED: ICD-10-CM

## 2025-03-06 DIAGNOSIS — K21.9 GASTRO-ESOPHAGEAL REFLUX DISEASE WITHOUT ESOPHAGITIS: ICD-10-CM

## 2025-03-06 DIAGNOSIS — R10.32 LEFT LOWER QUADRANT PAIN: ICD-10-CM

## 2025-03-06 PROCEDURE — 99214 OFFICE O/P EST MOD 30 MIN: CPT | Performed by: NURSE PRACTITIONER

## 2025-03-06 RX ORDER — OMEPRAZOLE 40 MG/1
40 CAPSULE, DELAYED RELEASE ORAL
Qty: 30 | Refills: 1 | Status: ACTIVE
Start: 2025-03-06

## 2025-03-06 RX ORDER — OMEPRAZOLE 20 MG/1
20 TABLET, DELAYED RELEASE ORAL
Qty: 90 | Refills: 1 | Status: COMPLETED
Start: 2024-02-09 | End: 2025-03-06

## 2025-03-06 RX ORDER — DICYCLOMINE HYDROCHLORIDE 10 MG/1
CAPSULE ORAL
Qty: 90 | Refills: 0 | Status: ACTIVE
Start: 2024-11-21

## 2025-03-10 ENCOUNTER — LAB (OUTPATIENT)
Dept: LAB | Facility: HOSPITAL | Age: 35
End: 2025-03-10
Payer: COMMERCIAL

## 2025-04-28 DIAGNOSIS — F41.9 ANXIETY, MILD: ICD-10-CM

## 2025-04-28 RX ORDER — SERTRALINE HYDROCHLORIDE 25 MG/1
25 TABLET, FILM COATED ORAL DAILY
Qty: 30 TABLET | Refills: 11 | Status: SHIPPED | OUTPATIENT
Start: 2025-04-28

## 2025-04-29 PROBLEM — E66.09 CLASS 2 OBESITY DUE TO EXCESS CALORIES WITHOUT SERIOUS COMORBIDITY WITH BODY MASS INDEX (BMI) OF 37.0 TO 37.9 IN ADULT: Chronic | Status: ACTIVE | Noted: 2025-04-29

## 2025-04-29 PROBLEM — R59.0: Status: ACTIVE | Noted: 2025-04-29

## 2025-04-29 PROBLEM — E66.812 CLASS 2 OBESITY DUE TO EXCESS CALORIES WITHOUT SERIOUS COMORBIDITY WITH BODY MASS INDEX (BMI) OF 37.0 TO 37.9 IN ADULT: Chronic | Status: ACTIVE | Noted: 2025-04-29

## 2025-04-29 PROBLEM — R16.1 SPLENOMEGALY: Status: ACTIVE | Noted: 2025-04-29

## 2025-05-05 ENCOUNTER — APPOINTMENT (OUTPATIENT)
Dept: PRIMARY CARE | Facility: CLINIC | Age: 35
End: 2025-05-05
Payer: COMMERCIAL

## 2025-05-19 ENCOUNTER — OFFICE (OUTPATIENT)
Dept: URBAN - METROPOLITAN AREA CLINIC 27 | Facility: CLINIC | Age: 35
End: 2025-05-19
Payer: COMMERCIAL

## 2025-05-19 VITALS
WEIGHT: 243 LBS | TEMPERATURE: 97.7 F | HEART RATE: 77 BPM | DIASTOLIC BLOOD PRESSURE: 80 MMHG | SYSTOLIC BLOOD PRESSURE: 124 MMHG | HEIGHT: 68 IN

## 2025-05-19 DIAGNOSIS — K76.89 OTHER SPECIFIED DISEASES OF LIVER: ICD-10-CM

## 2025-05-19 DIAGNOSIS — K21.9 GASTRO-ESOPHAGEAL REFLUX DISEASE WITHOUT ESOPHAGITIS: ICD-10-CM

## 2025-05-19 DIAGNOSIS — Z86.0101 PERSONAL HISTORY OF ADENOMATOUS AND SERRATED COLON POLYPS: ICD-10-CM

## 2025-05-19 PROCEDURE — 99213 OFFICE O/P EST LOW 20 MIN: CPT | Performed by: NURSE PRACTITIONER

## 2025-05-19 RX ORDER — OMEPRAZOLE 20 MG/1
20 CAPSULE, DELAYED RELEASE ORAL
Qty: 90 | Refills: 2 | Status: ACTIVE
Start: 2025-03-06

## 2025-07-13 ENCOUNTER — OFFICE VISIT (OUTPATIENT)
Dept: URGENT CARE | Age: 35
End: 2025-07-13
Payer: COMMERCIAL

## 2025-07-13 VITALS
TEMPERATURE: 98.2 F | WEIGHT: 235 LBS | BODY MASS INDEX: 35.73 KG/M2 | SYSTOLIC BLOOD PRESSURE: 112 MMHG | DIASTOLIC BLOOD PRESSURE: 81 MMHG | HEART RATE: 78 BPM | OXYGEN SATURATION: 99 % | RESPIRATION RATE: 17 BRPM

## 2025-07-13 DIAGNOSIS — L03.011 CELLULITIS OF THUMB, RIGHT: Primary | ICD-10-CM

## 2025-07-13 PROCEDURE — 99213 OFFICE O/P EST LOW 20 MIN: CPT | Performed by: FAMILY MEDICINE

## 2025-07-13 RX ORDER — LISDEXAMFETAMINE DIMESYLATE 40 MG/1
40 CAPSULE ORAL EVERY MORNING
COMMUNITY

## 2025-07-13 RX ORDER — MUPIROCIN 20 MG/G
OINTMENT TOPICAL 2 TIMES DAILY
Qty: 22 G | Refills: 0 | Status: SHIPPED | OUTPATIENT
Start: 2025-07-13 | End: 2025-07-23

## 2025-07-13 RX ORDER — CEPHALEXIN 500 MG/1
500 CAPSULE ORAL 3 TIMES DAILY
Qty: 21 CAPSULE | Refills: 0 | Status: SHIPPED | OUTPATIENT
Start: 2025-07-13 | End: 2025-07-20

## 2025-07-13 NOTE — PROGRESS NOTES
Subjective   Patient ID: Keya Moreno is a 35 y.o. female. They present today with a chief complaint of infection on rt thumb (After metal splinter removed, last TDAP unknown).    History of Present Illness  HPI  Patient presents with infection of the lateral aspect of her right thumb for the past 2 to 3 days.  She states she had a metal splinter at that site 4 days ago and removed it herself.  Since then there has been swelling and redness.  She has been soaking it in hot water with salt and bandaging it but states that last night the swelling and pain increased.  No fevers or chills.  No blood or discharge.  Past Medical History  Allergies as of 07/13/2025 - Reviewed 07/13/2025   Allergen Reaction Noted    House dust mite Itching 12/12/2023    Pollen extracts Hives and Unknown 04/29/2021    Tree and shrub pollen Itching 12/12/2023       Prescriptions Prior to Admission[1]     Medical History[2]    Surgical History[3]     reports that she has never smoked. She has never used smokeless tobacco. She reports that she does not currently use alcohol. She reports that she does not use drugs.    Review of Systems  Review of Systems       As in history of present                        Objective    Vitals:    07/13/25 0849   BP: 112/81   Pulse: 78   Resp: 17   Temp: 36.8 °C (98.2 °F)   SpO2: 99%   Weight: 107 kg (235 lb)     No LMP recorded.    Physical Exam  General: Vitals noted, no distress. Afebrile.   Vascular: Extremity warm and dry with good peripheral pulses noted  Extremities: No peripheral edema.  Normal flexion extension of IP joint on right thumb.  Skin: 1 cm area of tender erythema and swelling over lateral aspect of distal right thumb with no visible foreign body.  Small hole noted where splinter was apparently removed.  No discharge or blood.  Neuro: No focal neurologic deficits, NIH score of 0.    Procedures    Point of Care Test & Imaging Results from this visit  No results found for this visit on  07/13/25.   Imaging  No results found.    Cardiology, Vascular, and Other Imaging  No other imaging results found for the past 2 days      Diagnostic study results (if any) were reviewed by Abdirashid Rondon MD.    Assessment/Plan   Allergies, medications, history, and pertinent labs/EKGs/Imaging reviewed by Abdirashid Rondon MD.     Medical Decision Making  At time of discharge patient was clinically well-appearing and HDS for outpatient management. The patient and/or family was educated regarding diagnosis, supportive care, OTC and Rx medications. The patient and/or family was given the opportunity to ask questions prior to discharge.  They verbalized understanding of my discussion of the plans for treatment, expected course, indications to return to  or seek further evaluation in ED, and the need for timely follow up as directed.   They were provided with a work/school excuse if requested.    Orders and Diagnoses  Diagnoses and all orders for this visit:  Cellulitis of thumb, right      Medical Admin Record      Patient disposition: Home    Electronically signed by Abdirashid Rondon MD  9:06 AM           [1] (Not in a hospital admission)   [2]   Past Medical History:  Diagnosis Date    ADHD (attention deficit hyperactivity disorder)     Allergic     Anxiety     Arthritis     Asthma     Cellulitis of unspecified part of limb 07/07/2014    Cellulitis of leg    Depression, major, recurrent, mild 02/14/2024    Disorder of the skin and subcutaneous tissue, unspecified 07/14/2017    Skin lesion    Encounter for screening for malignant neoplasm of colon     Encounter for screening colonoscopy    Encounter for supervision of normal pregnancy, unspecified, unspecified trimester 03/08/2019    Intrauterine pregnancy    Headache     Hyperlipidemia 02/14/2024    Other chronic diseases of tonsils and adenoids 06/09/2017    Tonsil stone    Other conditions influencing health status     Stillbirth of single fetus    Personal history of  other drug therapy     History of influenza vaccination    Personal history of other medical treatment     History of cardiac monitoring    Personal history of other medical treatment     History of pulmonary function tests    Personal history of other medical treatment     History of cardiac monitoring    Personal history of other medical treatment     History of echocardiogram    Personal history of other specified conditions 03/08/2019    History of dysuria    Seasonal allergic rhinitis due to pollen 04/30/2021    Sleep disorder 02/14/2024   [3]   Past Surgical History:  Procedure Laterality Date    APPENDECTOMY  06/2024    Dr Griffin    OTHER SURGICAL HISTORY  05/13/2021    Colonoscopy complete for polypectomy    OTHER SURGICAL HISTORY  07/23/2018    Limits & Risks Obstetric: Stillbirth

## 2025-07-13 NOTE — PATIENT INSTRUCTIONS
Continue warm water soaks 3 times a day as discussed  Dry well after soaking in 2 times a day apply mupirocin to wound covered by Band-Aid  Take oral antibiotic 3 times a day until completed  Tylenol and ibuprofen as needed for pain  Follow-up if worsening

## 2025-10-14 ENCOUNTER — APPOINTMENT (OUTPATIENT)
Dept: SLEEP MEDICINE | Facility: CLINIC | Age: 35
End: 2025-10-14
Payer: COMMERCIAL